# Patient Record
Sex: MALE | Race: BLACK OR AFRICAN AMERICAN | NOT HISPANIC OR LATINO | URBAN - METROPOLITAN AREA
[De-identification: names, ages, dates, MRNs, and addresses within clinical notes are randomized per-mention and may not be internally consistent; named-entity substitution may affect disease eponyms.]

---

## 2023-01-27 PROBLEM — I10 ESSENTIAL HYPERTENSION: Status: ACTIVE | Noted: 2023-01-27

## 2023-01-27 PROBLEM — M25.519 SHOULDER PAIN: Status: ACTIVE | Noted: 2023-01-27

## 2023-01-27 PROBLEM — E78.5 TYPE 2 DIABETES MELLITUS WITH HYPERLIPIDEMIA (MULTI): Status: ACTIVE | Noted: 2023-01-27

## 2023-01-27 PROBLEM — R52 PHANTOM PAIN: Status: ACTIVE | Noted: 2023-01-27

## 2023-01-27 PROBLEM — M62.838 MUSCLE SPASM: Status: ACTIVE | Noted: 2023-01-27

## 2023-01-27 PROBLEM — R06.02 SHORTNESS OF BREATH: Status: ACTIVE | Noted: 2023-01-27

## 2023-01-27 PROBLEM — I50.9 CHF (CONGESTIVE HEART FAILURE) (MULTI): Status: ACTIVE | Noted: 2023-01-27

## 2023-01-27 PROBLEM — M48.02 CERVICAL STENOSIS OF SPINE: Status: ACTIVE | Noted: 2023-01-27

## 2023-01-27 PROBLEM — R32 URINARY INCONTINENCE: Status: ACTIVE | Noted: 2023-01-27

## 2023-01-27 PROBLEM — R78.81 BACTEREMIA: Status: ACTIVE | Noted: 2023-01-27

## 2023-01-27 PROBLEM — N17.9 AKI (ACUTE KIDNEY INJURY) (CMS-HCC): Status: ACTIVE | Noted: 2023-01-27

## 2023-01-27 PROBLEM — G89.4 CHRONIC PAIN SYNDROME: Status: ACTIVE | Noted: 2023-01-27

## 2023-01-27 PROBLEM — N39.0 UTI (URINARY TRACT INFECTION): Status: ACTIVE | Noted: 2023-01-27

## 2023-01-27 PROBLEM — S98.132A: Status: ACTIVE | Noted: 2023-01-27

## 2023-01-27 PROBLEM — M19.90 ARTHRITIS: Status: ACTIVE | Noted: 2023-01-27

## 2023-01-27 PROBLEM — M54.50 LOW BACK PAIN: Status: ACTIVE | Noted: 2023-01-27

## 2023-01-27 PROBLEM — L30.9 DERMATITIS: Status: ACTIVE | Noted: 2023-01-27

## 2023-01-27 PROBLEM — R20.0 NUMBNESS AND TINGLING OF HAND: Status: ACTIVE | Noted: 2023-01-27

## 2023-01-27 PROBLEM — R07.9 CHEST PAIN: Status: ACTIVE | Noted: 2023-01-27

## 2023-01-27 PROBLEM — S88.111A: Status: ACTIVE | Noted: 2023-01-27

## 2023-01-27 PROBLEM — G54.8 PHANTOM PAIN: Status: ACTIVE | Noted: 2023-01-27

## 2023-01-27 PROBLEM — M25.562 BILATERAL KNEE PAIN: Status: ACTIVE | Noted: 2023-01-27

## 2023-01-27 PROBLEM — R60.9 PERIPHERAL EDEMA: Status: ACTIVE | Noted: 2023-01-27

## 2023-01-27 PROBLEM — K21.9 CHRONIC GERD: Status: ACTIVE | Noted: 2023-01-27

## 2023-01-27 PROBLEM — M47.12 OTHER SPONDYLOSIS WITH MYELOPATHY, CERVICAL REGION: Status: ACTIVE | Noted: 2023-01-27

## 2023-01-27 PROBLEM — E78.5 HLD (HYPERLIPIDEMIA): Status: ACTIVE | Noted: 2023-01-27

## 2023-01-27 PROBLEM — M17.9 KNEE OSTEOARTHRITIS: Status: ACTIVE | Noted: 2023-01-27

## 2023-01-27 PROBLEM — M54.16 LUMBAR RADICULOPATHY: Status: ACTIVE | Noted: 2023-01-27

## 2023-01-27 PROBLEM — R60.0 LEG EDEMA, LEFT: Status: ACTIVE | Noted: 2023-01-27

## 2023-01-27 PROBLEM — G40.909: Status: ACTIVE | Noted: 2023-01-27

## 2023-01-27 PROBLEM — R60.0 PERIPHERAL EDEMA: Status: ACTIVE | Noted: 2023-01-27

## 2023-01-27 PROBLEM — R20.2 NUMBNESS AND TINGLING OF HAND: Status: ACTIVE | Noted: 2023-01-27

## 2023-01-27 PROBLEM — L97.421 SKIN ULCER OF LEFT HEEL, LIMITED TO BREAKDOWN OF SKIN (MULTI): Status: ACTIVE | Noted: 2023-01-27

## 2023-01-27 PROBLEM — G56.23 ULNAR NEUROPATHY OF BOTH UPPER EXTREMITIES: Status: ACTIVE | Noted: 2023-01-27

## 2023-01-27 PROBLEM — E87.6 HYPOKALEMIA: Status: ACTIVE | Noted: 2023-01-27

## 2023-01-27 PROBLEM — E11.69 TYPE 2 DIABETES MELLITUS WITH HYPERLIPIDEMIA (MULTI): Status: ACTIVE | Noted: 2023-01-27

## 2023-01-27 PROBLEM — I50.20 HFREF (HEART FAILURE WITH REDUCED EJECTION FRACTION) (MULTI): Status: ACTIVE | Noted: 2023-01-27

## 2023-01-27 PROBLEM — I25.10 CAD (CORONARY ARTERY DISEASE): Status: ACTIVE | Noted: 2023-01-27

## 2023-01-27 PROBLEM — E11.49: Status: ACTIVE | Noted: 2023-01-27

## 2023-01-27 PROBLEM — M25.561 BILATERAL KNEE PAIN: Status: ACTIVE | Noted: 2023-01-27

## 2023-01-27 PROBLEM — N18.30 CKD (CHRONIC KIDNEY DISEASE), STAGE III (MULTI): Status: ACTIVE | Noted: 2023-01-27

## 2023-01-27 PROBLEM — N52.9 ERECTILE DYSFUNCTION: Status: ACTIVE | Noted: 2023-01-27

## 2023-01-27 PROBLEM — L97.529 FOOT ULCER, LEFT (MULTI): Status: ACTIVE | Noted: 2023-01-27

## 2023-01-27 RX ORDER — ESOMEPRAZOLE MAGNESIUM 40 MG/1
40 CAPSULE, DELAYED RELEASE ORAL DAILY
COMMUNITY
Start: 2019-09-01 | End: 2023-03-15

## 2023-01-27 RX ORDER — MULTIVITAMIN
1 TABLET ORAL DAILY
COMMUNITY
Start: 2021-02-26

## 2023-01-27 RX ORDER — FUROSEMIDE 20 MG/1
40 TABLET ORAL DAILY
COMMUNITY
Start: 2017-08-08 | End: 2023-03-29 | Stop reason: ALTCHOICE

## 2023-01-27 RX ORDER — ASPIRIN 81 MG/1
81 TABLET ORAL DAILY
COMMUNITY
Start: 2021-02-26

## 2023-01-27 RX ORDER — ISOSORBIDE MONONITRATE 30 MG/1
1 TABLET, EXTENDED RELEASE ORAL DAILY
COMMUNITY
Start: 2021-02-26

## 2023-01-27 RX ORDER — NITROFURANTOIN 25; 75 MG/1; MG/1
100 CAPSULE ORAL 2 TIMES DAILY
COMMUNITY
End: 2023-03-15

## 2023-01-27 RX ORDER — PEN NEEDLE, DIABETIC 30 GX3/16"
1 NEEDLE, DISPOSABLE MISCELLANEOUS
COMMUNITY
End: 2023-06-05 | Stop reason: SDUPTHER

## 2023-01-27 RX ORDER — METOPROLOL SUCCINATE 50 MG/1
TABLET, EXTENDED RELEASE ORAL
COMMUNITY
Start: 2021-02-26 | End: 2023-03-15

## 2023-01-27 RX ORDER — CLINDAMYCIN HYDROCHLORIDE 300 MG/1
300 CAPSULE ORAL
COMMUNITY
Start: 2021-08-31 | End: 2023-03-15 | Stop reason: ALTCHOICE

## 2023-01-27 RX ORDER — OXYCODONE AND ACETAMINOPHEN 5; 325 MG/1; MG/1
1 TABLET ORAL EVERY 12 HOURS PRN
COMMUNITY
Start: 2020-06-05

## 2023-01-27 RX ORDER — FLUOCINONIDE 0.5 MG/G
1 CREAM TOPICAL 2 TIMES DAILY PRN
COMMUNITY
Start: 2020-06-10

## 2023-01-27 RX ORDER — POTASSIUM CHLORIDE 750 MG/1
10 TABLET, FILM COATED, EXTENDED RELEASE ORAL DAILY
COMMUNITY
Start: 2021-02-26

## 2023-01-27 RX ORDER — FERROUS SULFATE 325(65) MG
65 TABLET ORAL DAILY
COMMUNITY
Start: 2021-02-26

## 2023-01-27 RX ORDER — GLIPIZIDE 10 MG/1
10 TABLET ORAL
COMMUNITY
Start: 2021-02-26 | End: 2023-03-15

## 2023-01-27 RX ORDER — BACLOFEN 10 MG/1
5-10 TABLET ORAL NIGHTLY PRN
COMMUNITY
Start: 2017-05-30 | End: 2023-03-15 | Stop reason: ALTCHOICE

## 2023-01-27 RX ORDER — ATORVASTATIN CALCIUM 80 MG/1
80 TABLET, FILM COATED ORAL NIGHTLY
COMMUNITY
Start: 2018-03-23

## 2023-01-27 RX ORDER — CALCIUM CARBONATE 600 MG
600 TABLET ORAL DAILY
COMMUNITY
Start: 2021-02-26 | End: 2023-03-15

## 2023-01-27 RX ORDER — INSULIN GLARGINE 100 [IU]/ML
8 INJECTION, SOLUTION SUBCUTANEOUS EVERY MORNING
COMMUNITY
End: 2023-03-15 | Stop reason: SDUPTHER

## 2023-03-03 PROBLEM — E66.01 CLASS 2 SEVERE OBESITY WITH SERIOUS COMORBIDITY AND BODY MASS INDEX (BMI) OF 36.0 TO 36.9 IN ADULT (MULTI): Status: ACTIVE | Noted: 2023-03-03

## 2023-03-03 PROBLEM — E66.812 CLASS 2 SEVERE OBESITY WITH SERIOUS COMORBIDITY AND BODY MASS INDEX (BMI) OF 36.0 TO 36.9 IN ADULT: Status: ACTIVE | Noted: 2023-03-03

## 2023-03-03 RX ORDER — ALLOPURINOL 100 MG/1
TABLET ORAL
COMMUNITY
End: 2023-10-12 | Stop reason: SDUPTHER

## 2023-03-03 RX ORDER — NITROGLYCERIN 0.3 MG/1
0.3 TABLET SUBLINGUAL EVERY 5 MIN PRN
COMMUNITY

## 2023-03-03 RX ORDER — DULAGLUTIDE 1.5 MG/.5ML
1.5 INJECTION, SOLUTION SUBCUTANEOUS
COMMUNITY
End: 2023-05-08 | Stop reason: DRUGHIGH

## 2023-03-03 RX ORDER — CHOLECALCIFEROL (VITAMIN D3) 125 MCG
125 CAPSULE ORAL DAILY
COMMUNITY

## 2023-03-03 RX ORDER — HYDRALAZINE HYDROCHLORIDE 50 MG/1
50 TABLET, FILM COATED ORAL 3 TIMES DAILY
COMMUNITY

## 2023-03-03 RX ORDER — TORSEMIDE 20 MG/1
40 TABLET ORAL DAILY
COMMUNITY

## 2023-03-03 RX ORDER — PANTOPRAZOLE SODIUM 40 MG/1
40 TABLET, DELAYED RELEASE ORAL
COMMUNITY

## 2023-03-15 ENCOUNTER — TELEMEDICINE (OUTPATIENT)
Dept: PHARMACY | Facility: HOSPITAL | Age: 73
End: 2023-03-15
Payer: MEDICARE

## 2023-03-15 DIAGNOSIS — Z79.4 TYPE 2 DIABETES MELLITUS WITH HYPERGLYCEMIA, WITH LONG-TERM CURRENT USE OF INSULIN (MULTI): Primary | ICD-10-CM

## 2023-03-15 DIAGNOSIS — Z79.4 TYPE 2 DIABETES MELLITUS WITH HYPERGLYCEMIA, WITH LONG-TERM CURRENT USE OF INSULIN (MULTI): ICD-10-CM

## 2023-03-15 DIAGNOSIS — E11.65 TYPE 2 DIABETES MELLITUS WITH HYPERGLYCEMIA, WITH LONG-TERM CURRENT USE OF INSULIN (MULTI): Primary | ICD-10-CM

## 2023-03-15 DIAGNOSIS — E11.65 TYPE 2 DIABETES MELLITUS WITH HYPERGLYCEMIA, WITH LONG-TERM CURRENT USE OF INSULIN (MULTI): ICD-10-CM

## 2023-03-15 RX ORDER — CALCIUM CARBONATE 500(1250)
1 TABLET ORAL DAILY
COMMUNITY
Start: 2022-05-31

## 2023-03-15 RX ORDER — INSULIN GLARGINE 100 [IU]/ML
8 INJECTION, SOLUTION SUBCUTANEOUS NIGHTLY
Qty: 15 ML | Refills: 0 | Status: SHIPPED | OUTPATIENT
Start: 2023-03-15 | End: 2023-03-29 | Stop reason: HOSPADM

## 2023-03-15 RX ORDER — METOPROLOL SUCCINATE 25 MG/1
25 TABLET, EXTENDED RELEASE ORAL DAILY
COMMUNITY
Start: 2022-09-12

## 2023-03-15 NOTE — ASSESSMENT & PLAN NOTE
Patient reports well controlled blood glucose numbers at home. Denies hypoglycemic episodes.     Continue Lantus and Trulicity as prescribed  Obtain updated lab work

## 2023-03-15 NOTE — PROGRESS NOTES
Subjective   Patient ID: Vasquez Gastelum is a 73 y.o. male who presents for Diabetes.  Diabetes  He has type 2 diabetes mellitus. Current diabetic treatment includes insulin injections. He is compliant with treatment most of the time. He is following a generally unhealthy diet. His overall blood glucose range is 130-140 mg/dl. An ACE inhibitor/angiotensin II receptor blocker is not being taken.       Referring Provider: Tricia Arredondo MD     Objective     There were no vitals taken for this visit.     LAB  Lab Results   Component Value Date    BILITOT 0.8 12/09/2022    CALCIUM 9.4 12/09/2022    CO2 27 12/09/2022     12/09/2022    CREATININE 2.14 (H) 12/09/2022    GLUCOSE 423 (H) 12/09/2022    ALKPHOS 172 (H) 12/09/2022    K 4.7 12/09/2022    PROT 7.3 12/09/2022     (L) 12/09/2022    AST 19 12/09/2022    ALT 20 12/09/2022    BUN 30 (H) 12/09/2022    ANIONGAP 12 12/09/2022    ALBUMIN 3.6 12/09/2022    GFRMALE 32 (A) 12/09/2022     Lab Results   Component Value Date    TRIG 101 09/02/2021    CHOL 105 12/09/2022    HDL 38.8 (A) 12/09/2022     Lab Results   Component Value Date    HGBA1C 13.2 (A) 12/09/2022       Assessment/Plan   Problem List Items Addressed This Visit    None  Visit Diagnoses       Type 2 diabetes mellitus with hyperglycemia, with long-term current use of insulin (CMS/[facility])        Relevant Medications    insulin glargine (Lantus) 100 unit/mL (3 mL) pen          Patient to obtain updated labs next week. Follow-up the week after. Refill of Lantus sent to [facility] to mail to patient. Patient using 8 units daily, reports takes either in morning or evening depending on when he remembers, encouraged patient to stick to a consistent dosing schedule.     Jodie GarsiaD [facility]  Clinical Pharmacist     Verbal consent to manage patient's drug therapy was obtained from [the patient and/or an individual authorized to act on behalf of a patient]. They were informed they may decline to participate  or withdraw from participation in pharmacy services at any time.

## 2023-03-27 ENCOUNTER — PATIENT OUTREACH (OUTPATIENT)
Dept: CARE COORDINATION | Facility: CLINIC | Age: 73
End: 2023-03-27
Payer: MEDICARE

## 2023-03-27 RX ORDER — INSULIN LISPRO 100 [IU]/ML
1-5 INJECTION, SUSPENSION SUBCUTANEOUS
COMMUNITY
End: 2023-03-29 | Stop reason: ALTCHOICE

## 2023-03-27 NOTE — PROGRESS NOTES
This patient was discharged from: [facility]  Discharge diagnosis: Dysphagia, nausea and vomiting  Admit date: 3/20/23  Date of discharge: 3/25/23      No PCP appointments available within 14 days of discharge  Message sent to practice clinical pool to reach out to patient and schedule an appointment within 7-13 days from discharge date.    Engagement  Call Start Time: 1410 (3/27/2023  2:11 PM)    Medications  Medications reviewed with patient/caregiver?: Yes (3/27/2023  2:11 PM)  Is the patient having any side effects they believe may be caused by any medication additions or changes?: No (3/27/2023  2:11 PM)  Does the patient have all medications ordered at discharge?: Yes (3/27/2023  2:11 PM)  Care Management Interventions: Nurse provided patient education (3/27/2023  2:11 PM)  Is the patient taking all medications as directed (includes completed medication regime)?: Yes (3/27/2023  2:11 PM)  Care Management Interventions: Nurse provided patient education; Nurse notified pharmacy for assistance (3/27/2023  2:11 PM)  Medication Comments: new/changed medications reconciled only, patient declines any issues or concerns with his regular medications (3/27/2023  2:11 PM)    Appointments  Does the patient have a primary care provider?: Yes (3/27/2023  2:11 PM)  Care Management Interventions: Educated patient on importance of making appointment (3/27/2023  2:11 PM)  Care Management Interventions: Advised patient to keep appointment; Advised to schedule with specialist (esophageal manometry, endocrine, podiatry. patient verbalizes understanding.) (3/27/2023  2:11 PM)    Self Management  What is the home health agency?: [facility] (3/27/2023  2:11 PM)  Has home health visited the patient within 72 hours of discharge?: Yes (visit scheduled tomorrow 3/28) (3/27/2023  2:11 PM)  What Durable Medical Equipment (DME) was ordered?: wound care supplies (3/27/2023  2:11 PM)  Has all Durable Medical  Equipment (DME) been delivered?: Yes (3/27/2023  2:11 PM)    Patient Teaching  Does the patient have access to their discharge instructions?: Yes (3/27/2023  2:11 PM)  Care Management Interventions: Reviewed instructions with patient (3/27/2023  2:11 PM)  What is the patient's perception of their health status since discharge?: Improving (reports tolerating liquid diet well) (3/27/2023  2:11 PM)  Is the patient/caregiver able to teach back the hierarchy of who to call/visit for symptoms/problems? PCP, Specialist, Home Health nurse, Urgent Care, ED, 911: Yes (3/27/2023  2:11 PM)

## 2023-03-29 ENCOUNTER — PATIENT OUTREACH (OUTPATIENT)
Dept: PHARMACY | Facility: HOSPITAL | Age: 73
End: 2023-03-29
Payer: MEDICARE

## 2023-03-29 RX ORDER — BLOOD SUGAR DIAGNOSTIC
1 STRIP MISCELLANEOUS
COMMUNITY
Start: 2022-12-23

## 2023-03-29 RX ORDER — INSULIN LISPRO 100 [IU]/ML
1-5 INJECTION, SOLUTION INTRAVENOUS; SUBCUTANEOUS
COMMUNITY
Start: 2023-03-28

## 2023-03-29 RX ORDER — BACLOFEN 10 MG/1
5-10 TABLET ORAL NIGHTLY PRN
COMMUNITY

## 2023-03-29 RX ORDER — LANCETS
1 EACH MISCELLANEOUS
COMMUNITY
Start: 2022-12-23

## 2023-03-29 NOTE — PROGRESS NOTES
"Transitional Care Management: Pharmacy    Subjective   Vasquez Gastelum is a 73 y.o. male who was referred to Clinical Pharmacy Team to complete TCM medication reconciliation. A comprehensive medication review was completed with the patient. patient had all medications and/or an updated medication list in front of them during the telephone encounter.     General Medication Management    Type of medication management: comprehensive medication review, transitions of care  Referred by: nurse  Recipient: beneficiary  Provider: physician  Visit type: initial  Method of contact: by telephone  Medications at visit: does not bring in medications         Admission Date: 03/20/2023  Discharge Date: 03/25/2023  Discharge Diagnosis: Dysphagia, nausea and vomiting   Discharged From: [facility]     Notable medication changes following discharge:  START:  Humalog kwikpen 100 unit/mL inject 1-5 units 3 times a day with meals per sliding scale   STOP:  Lantus 100 unit/mL inject 8 units once daily at bedtime    MEDICATION RECONCILIATION  Added:  Accu-chek test strips use 1 strip to test blood sugar  Accu-chek softclix lancets use 1 lancet to test blood sugar  Humalog kwikpen 100 unit/mL 1-5 units 3 times a day with meals per sliding scale   Baclofen 10 mg 0.5-1 tablet nightly as needed   Changed:  Fluocinonide 0.05% cream apply to affected areas twice daily as needed (added as needed in instructions)  Pen needles 32G 5/32\" use 1 pen needle 3 times daily with insulin injections (changed from once daily)  Removed:  Furosemide 20 mg once daily (patient is taking torsemide)  Lantus 100 unit/mL 8 units daily at bedtime (discontinued at discharge)  Humalog Mix 50-50 1-5 units 3 times a day per insulin instructions (patient is using regular insulin lispro only not Humalog Mix)    Current Outpatient Medications on File Prior to Visit   Medication Sig Dispense Refill    Accu-Chek Guide test strips strip 1 strip. To test blood " "sugar      Accu-Chek Softclix Lancets misc 1 Lancet. To test blood sugar      allopurinol (Zyloprim) 100 mg tablet       aspirin 81 mg EC tablet Take 1 tablet (81 mg) by mouth once daily.      atorvastatin (Lipitor) 80 mg tablet Take 1 tablet (80 mg) by mouth once daily at bedtime.      baclofen (Lioresal) 10 mg tablet Take 0.5-1 tablets (5-10 mg) by mouth as needed at bedtime for muscle spasms.      calcium 500 mg calcium (1,250 mg) tablet Take 1 tablet (500 mg) by mouth once daily.      cholecalciferol (Vitamin D-3) 125 MCG (5000 UT) capsule Take 1 capsule (125 mcg) by mouth once daily.      dulaglutide (Trulicity) 1.5 mg/0.5 mL pen injector Inject 1.5 mg under the skin 1 (one) time per week.      ferrous sulfate 325 (65 Fe) MG tablet Take 1 tablet (65 mg of iron) by mouth once daily.      fluocinonide 0.05 % cream Apply 1 Application topically 2 times a day as needed.      HumaLOG KwikPen Insulin 100 unit/mL injection Inject 1-5 Units under the skin in the morning and 1-5 Units at noon and 1-5 Units in the evening. Inject with meals. Per sliding scale.      hydrALAZINE (Apresoline) 50 mg tablet Take 1 tablet (50 mg) by mouth in the morning and 1 tablet (50 mg) in the evening and 1 tablet (50 mg) before bedtime.      isosorbide mononitrate ER (Imdur) 30 mg 24 hr tablet Take 1 tablet (30 mg) by mouth once daily.      metoprolol succinate XL (Toprol-XL) 25 mg 24 hr tablet Take 1 tablet (25 mg) by mouth once daily.      multivitamin tablet Take 1 tablet by mouth once daily.      nitroglycerin (Nitrostat) 0.3 mg SL tablet Place 1 tablet (0.3 mg) under the tongue every 5 minutes if needed for chest pain.      oxyCODONE-acetaminophen (Percocet) 5-325 mg tablet Take 1 tablet by mouth every 12 hours if needed.      pantoprazole (ProtoNix) 40 mg EC tablet Take 1 tablet (40 mg) by mouth once daily in the morning. Take before meals. Do not crush, chew, or split.      pen needle, diabetic 32 gauge x 5/32\" needle 1 Pen needle  " in the morning and 1 Pen needle at noon and 1 Pen needle in the evening. Take before meals. With insulin injections.      potassium chloride CR (Klor-Con) 10 mEq ER tablet Take 1 tablet (10 mEq) by mouth once daily.      torsemide (Demadex) 20 mg tablet Take 2 tablets (40 mg) by mouth once daily.      [DISCONTINUED] insulin lispro protamin-lispro (HumaLOG Mix 50-50 KwikPen) 100 unit/mL (50-50) injection Inject 1-5 Units under the skin in the morning and 1-5 Units in the evening. Inject with meals. Take as directed per insulin instructions.  Inject 1-5 Units subcutaneously as directed..      [DISCONTINUED] furosemide (Lasix) 20 mg tablet Take 2 tablets (40 mg) by mouth 1 (one) time each day.      [DISCONTINUED] insulin glargine (Lantus) 100 unit/mL (3 mL) pen Inject 8 Units under the skin once daily at bedtime. (Patient not taking: Reported on 3/27/2023) 15 mL 0     No current facility-administered medications on file prior to visit.      Allergies   Allergen Reactions    Nsaids (Non-Steroidal Anti-Inflammatory Drug) Unknown    Other Rash     tomatoes       [facility]  [address] [place] [id number]  Phone: [phone] Fax: [phone]    [facility] [place], [place] - [address]  [address]  [address]  [place] [id number]  Phone: [phone] Fax: [phone]       No issues reported in regards to accessibility affordability adherence adverse effects organization.  Medication Adherence    Patient reported X missed doses in the last 7 days: 0  Demonstrates understanding of importance of adherence: yes  Informant: patient  Reliability of informant: reliable  Estimated medication adherence level: good  Reasons for non-adherence: no problems identified         Objective     LAB  Wt Readings from Last 3 Encounters:   12/09/22 113 kg (249 lb)   09/02/21 112 kg (248 lb)   09/25/20 109 kg (241 lb)     Pulse Readings from Last 3 Encounters:   12/09/22 77   09/02/21 88    09/25/20 85     BP Readings from Last 3 Encounters:   12/09/22 118/60   09/02/21 118/50   09/25/20 120/70      Lab Results   Component Value Date    BILITOT 0.8 12/09/2022    CALCIUM 9.4 12/09/2022    CO2 27 12/09/2022     12/09/2022    CREATININE 2.14 (H) 12/09/2022    CREATININE 2.13 (H) 10/25/2022    CREATININE 1.85 (H) 09/02/2021    GLUCOSE 423 (H) 12/09/2022    ALKPHOS 172 (H) 12/09/2022    K 4.7 12/09/2022    PROT 7.3 12/09/2022     (L) 12/09/2022    AST 19 12/09/2022    ALT 20 12/09/2022    BUN 30 (H) 12/09/2022    ANIONGAP 12 12/09/2022    ALBUMIN 3.6 12/09/2022    GFRMALE 32 (A) 12/09/2022    GFRMALE 32 (A) 10/25/2022     Lab Results   Component Value Date    TRIG 101 09/02/2021    CHOL 105 12/09/2022    CHOL 101 09/02/2021    CHOL 114 06/18/2019    HDL 38.8 (A) 12/09/2022    HDL 49.6 09/02/2021    HDL 58.3 06/18/2019     Lab Results   Component Value Date    HGBA1C 13.2 (A) 12/09/2022    HGBA1C 7.2 (A) 09/02/2021    HGBA1C 8.7 09/25/2020       Outpatient Morphine Milligram Equivalents Per Day       3/29/23 and after 15 MME/Day      Order Name Dose Route Frequency Maximum MME/Day     oxyCODONE-acetaminophen (Percocet) 5-325 mg tablet 1 tablet oral Every 12 hours PRN 15 MME/Day    Total Potential Morphine Milligram Equivalents Per Day 15 MME/Day      Calculation Information          oxyCODONE-acetaminophen (Percocet) 5-325 mg tablet    oxyCODONE-acetaminophen 5-325 mg tablet: single dose of 5 mg of opioid * 2 doses per day * morphine equivalence factor of 1.5 = 15 MME/Day                                    DRUG INTERACTIONS  No significant drug-drug interactions exist that require adjustment to therapy    Assessment/Plan    Indication, effectiveness, safety and convenience of his medications were reviewed today. The patient's medical conditions were assessed, evaluated, and deemed meeting goals of drug therapy, with the following exceptions.      Medication Therapy Recommendations Needing  Review  No medication therapy recommendations to display     Completed Medication Therapy Recommendations  No medication therapy recommendations to display    Comments/Recommendations to PCP:  Patient's insulin glargine was discontinued at discharge and he was continued on sliding scale insulin lispro   Patient recently had a 90 day supply of Glipizide filled 03/20/23 which was also not continued at discharge. Patient did not state that he was taking this prior to his hospitalization, however  All medications are dosed appropriately based on the most up to date renal and hepatic lab results    Ajith Vu PharmD  [facility] PGY-1 Pharmacy Resident     Verbal consent to manage patient's drug therapy was obtained from the patient. They were informed they may decline to participate or withdraw from participation in pharmacy services at any time.

## 2023-03-29 NOTE — PROGRESS NOTES
I reviewed the progress note and agree with the resident’s findings and plans as written. Case discussed with resident.    Jodie Pacheco, AdyD

## 2023-04-05 ENCOUNTER — TELEPHONE (OUTPATIENT)
Dept: PRIMARY CARE | Facility: CLINIC | Age: 73
End: 2023-04-05

## 2023-04-07 ENCOUNTER — PATIENT OUTREACH (OUTPATIENT)
Dept: CARE COORDINATION | Facility: CLINIC | Age: 73
End: 2023-04-07
Payer: MEDICARE

## 2023-04-07 NOTE — PROGRESS NOTES
Unable to reach patient for follow up call back after patient's hospitalization. No follow up appt made at this time. Attempt made and left voicemail with call back number for patient if any questions or concerns do arise.

## 2023-04-10 ENCOUNTER — TELEMEDICINE (OUTPATIENT)
Dept: PHARMACY | Facility: HOSPITAL | Age: 73
End: 2023-04-10
Payer: MEDICARE

## 2023-04-10 DIAGNOSIS — Z79.4 TYPE 2 DIABETES MELLITUS WITH HYPERGLYCEMIA, WITH LONG-TERM CURRENT USE OF INSULIN (MULTI): ICD-10-CM

## 2023-04-10 DIAGNOSIS — E11.69 TYPE 2 DIABETES MELLITUS WITH HYPERLIPIDEMIA (MULTI): Primary | ICD-10-CM

## 2023-04-10 DIAGNOSIS — E11.65 TYPE 2 DIABETES MELLITUS WITH HYPERGLYCEMIA, WITH LONG-TERM CURRENT USE OF INSULIN (MULTI): ICD-10-CM

## 2023-04-10 DIAGNOSIS — E78.5 TYPE 2 DIABETES MELLITUS WITH HYPERLIPIDEMIA (MULTI): Primary | ICD-10-CM

## 2023-04-10 NOTE — ASSESSMENT & PLAN NOTE
Patient reports well controlled home BG. Denies episodes of hypoglycemia.     CONTINUE Trulicity 1.5 mg/0.5 mL once weekly   CONTINUE Humalog sliding scale

## 2023-04-10 NOTE — PROGRESS NOTES
Subjective     Patient ID: Vasquez Gastelum is a 73 y.o. male who presents for Diabetes.    Diabetes  He presents for his follow-up diabetic visit. He has type 2 diabetes mellitus. There are no hypoglycemic associated symptoms. Current diabetic treatment includes insulin injections (GLP-1 RA (Trulicity)). He is compliant with treatment all of the time. He never participates in exercise. (Reports BG below 150 mg/dL on average)     Patient unaware of endocrinology appointment with [facility] provider tomorrow 4/11/23. States he has not received any communication from them. States he is going to see [facility] Internal Medicine for his throat.     Allergies   Allergen Reactions    Nsaids (Non-Steroidal Anti-Inflammatory Drug) Unknown    Other Rash     tomatoes       Objective       SECONDARY PREVENTION  - Statin? Yes  - ACE-I/ARB? No  - Aspirin? Yes    Current monitoring regimen:   Patient is using: glucometer    SMBG Readings: Reports few readings above 150 mg/dL which he corrects using SS Humalog     Any episodes of hypoglycemia? no  Hypoglycemia awareness? Yes    There were no vitals taken for this visit.    Pertinent PMH Review:  - PMH of Pancreatitis: No  - PMH/FH of Medullary Thyroid Cancer: No  - PMH of Retinopathy: No  - PMH of Urinary Tract Infections: No    Lab Review  Lab Results   Component Value Date    BILITOT 0.8 12/09/2022    CALCIUM 9.4 12/09/2022    CO2 27 12/09/2022     12/09/2022    CREATININE 2.14 (H) 12/09/2022    GLUCOSE 423 (H) 12/09/2022    ALKPHOS 172 (H) 12/09/2022    K 4.7 12/09/2022    PROT 7.3 12/09/2022     (L) 12/09/2022    AST 19 12/09/2022    ALT 20 12/09/2022    BUN 30 (H) 12/09/2022    ANIONGAP 12 12/09/2022    ALBUMIN 3.6 12/09/2022    GFRMALE 32 (A) 12/09/2022     Lab Results   Component Value Date    TRIG 101 09/02/2021    CHOL 105 12/09/2022    HDL 38.8 (A) 12/09/2022     Lab Results   Component Value Date    HGBA1C 13.2 (A) 12/09/2022     The ASCVD Risk  score (Camille DIAMOND, et al., 2019) failed to calculate for the following reasons:    The valid total cholesterol range is 130 to 320 mg/dL    Health Maintenance:   Foot Exam: Unknown  Eye Exam: Unknown   Lipid Panel: 12/9/22  Urine Album/in: 12/9/22      Assessment/Plan     Problem List Items Addressed This Visit          Endocrine/Metabolic     Patient reports well controlled home BG. Denies episodes of hypoglycemia.     CONTINUE Trulicity 1.5 mg/0.5 mL once weekly   CONTINUE Humalog sliding scale            Type 2 diabetes mellitus with hyperlipidemia (CMS/[facility]) - Primary     Other Visit Diagnoses       Type 2 diabetes mellitus with hyperglycemia, with long-term current use of insulin (CMS/[facility])                Type 2 diabetes mellitus, is not at goal. Last A1C significantly elevated, home BG reported within range     Follow up: I recommend diabetes care be 1 month.    Jodie Pacheco PharmD [facility]  Clinical Pharmacist

## 2023-05-01 ENCOUNTER — OFFICE VISIT (OUTPATIENT)
Dept: PRIMARY CARE | Facility: CLINIC | Age: 73
End: 2023-05-01
Payer: MEDICARE

## 2023-05-01 VITALS
HEART RATE: 88 BPM | WEIGHT: 254.8 LBS | BODY MASS INDEX: 37.74 KG/M2 | DIASTOLIC BLOOD PRESSURE: 52 MMHG | OXYGEN SATURATION: 100 % | RESPIRATION RATE: 13 BRPM | SYSTOLIC BLOOD PRESSURE: 116 MMHG | TEMPERATURE: 97.9 F | HEIGHT: 69 IN

## 2023-05-01 DIAGNOSIS — E11.49 DM TYPE 2 CAUSING NEUROLOGICAL DISEASE (MULTI): Primary | ICD-10-CM

## 2023-05-01 PROBLEM — E11.3591: Status: ACTIVE | Noted: 2018-05-01

## 2023-05-01 PROBLEM — E16.2 HYPOGLYCEMIA: Status: ACTIVE | Noted: 2023-02-03

## 2023-05-01 PROBLEM — E04.1 THYROID NODULE: Status: ACTIVE | Noted: 2022-04-06

## 2023-05-01 PROBLEM — L97.422 NON-PRESSURE CHRONIC ULCER OF LEFT HEEL AND MIDFOOT WITH FAT LAYER EXPOSED (MULTI): Status: ACTIVE | Noted: 2023-03-22

## 2023-05-01 PROBLEM — Z98.61 CORONARY ANGIOPLASTY STATUS: Status: ACTIVE | Noted: 2020-09-03

## 2023-05-01 PROBLEM — R53.1 WEAKNESS: Status: ACTIVE | Noted: 2020-07-12

## 2023-05-01 PROBLEM — R13.10 DYSPHAGIA: Status: ACTIVE | Noted: 2023-03-21

## 2023-05-01 PROBLEM — I50.32 CHRONIC DIASTOLIC CHF (CONGESTIVE HEART FAILURE) (MULTI): Status: ACTIVE | Noted: 2020-07-15

## 2023-05-01 PROBLEM — S62.327A CLOSED DISPLACED FRACTURE OF SHAFT OF FIFTH METACARPAL BONE OF LEFT HAND: Status: ACTIVE | Noted: 2020-03-06

## 2023-05-01 PROBLEM — L97.423: Status: ACTIVE | Noted: 2020-07-12

## 2023-05-01 PROBLEM — Z98.890 S/P EYE SURGERY: Status: ACTIVE | Noted: 2018-07-16

## 2023-05-01 PROBLEM — N18.32 STAGE 3B CHRONIC KIDNEY DISEASE (MULTI): Status: ACTIVE | Noted: 2022-08-10

## 2023-05-01 PROBLEM — H25.811 COMBINED FORM OF SENILE CATARACT OF RIGHT EYE: Status: ACTIVE | Noted: 2018-05-01

## 2023-05-01 PROBLEM — E66.9 OBESITY, CLASS I, BMI 30-34.9: Status: ACTIVE | Noted: 2022-12-20

## 2023-05-01 PROBLEM — L08.9 INFECTED ABRASION OF LEFT FOOT: Status: ACTIVE | Noted: 2018-12-17

## 2023-05-01 PROBLEM — R11.10 INTRACTABLE VOMITING: Status: ACTIVE | Noted: 2023-03-21

## 2023-05-01 PROBLEM — M86.9: Status: ACTIVE | Noted: 2020-02-19

## 2023-05-01 PROBLEM — R82.81 PYURIA: Status: ACTIVE | Noted: 2023-03-21

## 2023-05-01 PROBLEM — S52.132A CLOSED DISPLACED FRACTURE OF NECK OF LEFT RADIUS: Status: ACTIVE | Noted: 2020-03-06

## 2023-05-01 PROBLEM — S90.822A: Status: ACTIVE | Noted: 2018-12-17

## 2023-05-01 PROBLEM — I72.9 PSEUDOANEURYSM (CMS-HCC): Status: ACTIVE | Noted: 2018-04-03

## 2023-05-01 PROBLEM — H35.351 CME (CYSTOID MACULAR EDEMA), RIGHT: Status: ACTIVE | Noted: 2018-07-16

## 2023-05-01 PROBLEM — L08.9: Status: ACTIVE | Noted: 2018-12-17

## 2023-05-01 PROBLEM — E66.811 OBESITY, CLASS I, BMI 30-34.9: Status: ACTIVE | Noted: 2022-12-20

## 2023-05-01 PROBLEM — Z89.511 HISTORY OF BELOW-KNEE AMPUTATION OF RIGHT LOWER EXTREMITY (MULTI): Status: ACTIVE | Noted: 2019-08-15
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
## 2023-05-01 PROBLEM — E11.621: Status: ACTIVE | Noted: 2020-07-12

## 2023-05-01 PROBLEM — S90.812A INFECTED ABRASION OF LEFT FOOT: Status: ACTIVE | Noted: 2018-12-17

## 2023-05-01 PROBLEM — L97.429: Status: ACTIVE | Noted: 2022-12-20

## 2023-05-01 PROBLEM — L97.411: Status: ACTIVE | Noted: 2019-10-02

## 2023-05-01 PROBLEM — M79.672 FOOT PAIN, LEFT: Status: ACTIVE | Noted: 2020-06-24

## 2023-05-01 PROBLEM — M25.562 PAIN IN LEFT KNEE: Status: ACTIVE | Noted: 2020-07-15

## 2023-05-01 PROBLEM — I50.23 ACUTE ON CHRONIC SYSTOLIC CHF (CONGESTIVE HEART FAILURE) (MULTI): Status: ACTIVE | Noted: 2020-05-15

## 2023-05-01 PROBLEM — R53.81 DEBILITY: Status: ACTIVE | Noted: 2022-04-09

## 2023-05-01 LAB
ALANINE AMINOTRANSFERASE (SGPT) (U/L) IN SER/PLAS: 50 U/L (ref 10–52)
ALBUMIN (G/DL) IN SER/PLAS: 3.8 G/DL (ref 3.4–5)
ALBUMIN (MG/L) IN URINE: 110.5 MG/L
ALBUMIN/CREATININE (UG/MG) IN URINE: 85.7 UG/MG CRT (ref 0–30)
ALKALINE PHOSPHATASE (U/L) IN SER/PLAS: 128 U/L (ref 33–136)
ANION GAP IN SER/PLAS: 13 MMOL/L (ref 10–20)
ASPARTATE AMINOTRANSFERASE (SGOT) (U/L) IN SER/PLAS: 49 U/L (ref 9–39)
BASOPHILS (10*3/UL) IN BLOOD BY AUTOMATED COUNT: 0.04 X10E9/L (ref 0–0.1)
BASOPHILS/100 LEUKOCYTES IN BLOOD BY AUTOMATED COUNT: 0.7 % (ref 0–2)
BILIRUBIN TOTAL (MG/DL) IN SER/PLAS: 0.7 MG/DL (ref 0–1.2)
CALCIUM (MG/DL) IN SER/PLAS: 9.2 MG/DL (ref 8.6–10.6)
CARBON DIOXIDE, TOTAL (MMOL/L) IN SER/PLAS: 23 MMOL/L (ref 21–32)
CHLORIDE (MMOL/L) IN SER/PLAS: 112 MMOL/L (ref 98–107)
CHOLESTEROL (MG/DL) IN SER/PLAS: 90 MG/DL (ref 0–199)
CHOLESTEROL IN HDL (MG/DL) IN SER/PLAS: 51 MG/DL
CHOLESTEROL/HDL RATIO: 1.8
CREATININE (MG/DL) IN SER/PLAS: 2.26 MG/DL (ref 0.5–1.3)
CREATININE (MG/DL) IN URINE: 129 MG/DL (ref 20–370)
EOSINOPHILS (10*3/UL) IN BLOOD BY AUTOMATED COUNT: 0.38 X10E9/L (ref 0–0.4)
EOSINOPHILS/100 LEUKOCYTES IN BLOOD BY AUTOMATED COUNT: 6.8 % (ref 0–6)
ERYTHROCYTE DISTRIBUTION WIDTH (RATIO) BY AUTOMATED COUNT: 15 % (ref 11.5–14.5)
ERYTHROCYTE MEAN CORPUSCULAR HEMOGLOBIN CONCENTRATION (G/DL) BY AUTOMATED: 31.3 G/DL (ref 32–36)
ERYTHROCYTE MEAN CORPUSCULAR VOLUME (FL) BY AUTOMATED COUNT: 90 FL (ref 80–100)
ERYTHROCYTES (10*6/UL) IN BLOOD BY AUTOMATED COUNT: 3.61 X10E12/L (ref 4.5–5.9)
ESTIMATED AVERAGE GLUCOSE FOR HBA1C: 151 MG/DL
GFR MALE: 30 ML/MIN/1.73M2
GLUCOSE (MG/DL) IN SER/PLAS: 137 MG/DL (ref 74–99)
HEMATOCRIT (%) IN BLOOD BY AUTOMATED COUNT: 32.6 % (ref 41–52)
HEMOGLOBIN (G/DL) IN BLOOD: 10.2 G/DL (ref 13.5–17.5)
HEMOGLOBIN A1C/HEMOGLOBIN TOTAL IN BLOOD: 6.9 %
IMMATURE GRANULOCYTES/100 LEUKOCYTES IN BLOOD BY AUTOMATED COUNT: 0.4 % (ref 0–0.9)
LDL: 25 MG/DL (ref 0–99)
LEUKOCYTES (10*3/UL) IN BLOOD BY AUTOMATED COUNT: 5.6 X10E9/L (ref 4.4–11.3)
LYMPHOCYTES (10*3/UL) IN BLOOD BY AUTOMATED COUNT: 2.12 X10E9/L (ref 0.8–3)
LYMPHOCYTES/100 LEUKOCYTES IN BLOOD BY AUTOMATED COUNT: 38.1 % (ref 13–44)
MONOCYTES (10*3/UL) IN BLOOD BY AUTOMATED COUNT: 0.4 X10E9/L (ref 0.05–0.8)
MONOCYTES/100 LEUKOCYTES IN BLOOD BY AUTOMATED COUNT: 7.2 % (ref 2–10)
NEUTROPHILS (10*3/UL) IN BLOOD BY AUTOMATED COUNT: 2.6 X10E9/L (ref 1.6–5.5)
NEUTROPHILS/100 LEUKOCYTES IN BLOOD BY AUTOMATED COUNT: 46.8 % (ref 40–80)
NRBC (PER 100 WBCS) BY AUTOMATED COUNT: 0 /100 WBC (ref 0–0)
PLATELETS (10*3/UL) IN BLOOD AUTOMATED COUNT: 199 X10E9/L (ref 150–450)
POTASSIUM (MMOL/L) IN SER/PLAS: 4.5 MMOL/L (ref 3.5–5.3)
PROTEIN TOTAL: 7 G/DL (ref 6.4–8.2)
SODIUM (MMOL/L) IN SER/PLAS: 143 MMOL/L (ref 136–145)
TRIGLYCERIDE (MG/DL) IN SER/PLAS: 70 MG/DL (ref 0–149)
UREA NITROGEN (MG/DL) IN SER/PLAS: 31 MG/DL (ref 6–23)
VLDL: 14 MG/DL (ref 0–40)

## 2023-05-01 PROCEDURE — 99212 OFFICE O/P EST SF 10 MIN: CPT | Performed by: STUDENT IN AN ORGANIZED HEALTH CARE EDUCATION/TRAINING PROGRAM

## 2023-05-01 PROCEDURE — 1159F MED LIST DOCD IN RCRD: CPT | Performed by: STUDENT IN AN ORGANIZED HEALTH CARE EDUCATION/TRAINING PROGRAM

## 2023-05-01 PROCEDURE — 3078F DIAST BP <80 MM HG: CPT | Performed by: STUDENT IN AN ORGANIZED HEALTH CARE EDUCATION/TRAINING PROGRAM

## 2023-05-01 PROCEDURE — 3074F SYST BP LT 130 MM HG: CPT | Performed by: STUDENT IN AN ORGANIZED HEALTH CARE EDUCATION/TRAINING PROGRAM

## 2023-05-01 RX ORDER — ACETAMINOPHEN 325 MG/1
650 TABLET ORAL
COMMUNITY
Start: 2022-06-27

## 2023-05-01 RX ORDER — UBIQUINOL 100 MG
CAPSULE ORAL
COMMUNITY
Start: 2023-03-25

## 2023-05-01 RX ORDER — GLIPIZIDE 10 MG/1
10 TABLET ORAL 2 TIMES DAILY
COMMUNITY
End: 2023-05-01 | Stop reason: ALTCHOICE

## 2023-05-01 RX ORDER — METOPROLOL TARTRATE 25 MG/1
TABLET, FILM COATED ORAL
COMMUNITY
Start: 2019-12-05 | End: 2023-05-08 | Stop reason: ALTCHOICE

## 2023-05-01 RX ORDER — AMOXICILLIN AND CLAVULANATE POTASSIUM 875; 125 MG/1; MG/1
1 TABLET, FILM COATED ORAL 2 TIMES DAILY
Qty: 14 TABLET | Refills: 0 | COMMUNITY
Start: 2023-04-14 | End: 2023-04-21

## 2023-05-01 RX ORDER — SILDENAFIL 100 MG/1
1 TABLET, FILM COATED ORAL AS NEEDED
COMMUNITY
Start: 2023-01-25

## 2023-05-01 RX ORDER — LIDOCAINE 50 MG/G
1 PATCH TOPICAL
COMMUNITY
Start: 2019-08-15

## 2023-05-01 RX ORDER — ONDANSETRON 4 MG/1
TABLET, ORALLY DISINTEGRATING ORAL
COMMUNITY
Start: 2023-04-18

## 2023-05-01 RX ORDER — OMEPRAZOLE 40 MG/1
CAPSULE, DELAYED RELEASE ORAL
COMMUNITY
End: 2023-11-09 | Stop reason: SDUPTHER

## 2023-05-01 ASSESSMENT — LIFESTYLE VARIABLES
SKIP TO QUESTIONS 9-10: 1
HOW OFTEN DO YOU HAVE A DRINK CONTAINING ALCOHOL: NEVER
HOW OFTEN DO YOU HAVE SIX OR MORE DRINKS ON ONE OCCASION: NEVER
HOW MANY STANDARD DRINKS CONTAINING ALCOHOL DO YOU HAVE ON A TYPICAL DAY: PATIENT DOES NOT DRINK
AUDIT-C TOTAL SCORE: 0

## 2023-05-01 ASSESSMENT — PATIENT HEALTH QUESTIONNAIRE - PHQ9
2. FEELING DOWN, DEPRESSED OR HOPELESS: NOT AT ALL
1. LITTLE INTEREST OR PLEASURE IN DOING THINGS: NOT AT ALL
SUM OF ALL RESPONSES TO PHQ9 QUESTIONS 1 & 2: 0

## 2023-05-01 NOTE — PATIENT INSTRUCTIONS
Continue with current medications.  Blood work done today   All the nonurgent lab results will be discussed with you at your next office visit.  Please arrive 15 minutes before your appointment.   Return to office in 3-4 months or as needed

## 2023-05-01 NOTE — PROGRESS NOTES
Subjective   Patient ID: Vasquez Gastelum is a pleasant 73 y.o. male who presents for Diabetes.  HPI  Patient is here for diabetes follow-up.  His last HbA1c was 7.3 on 3/20/2023.  He is compliant with his medications.  Blood pressure stable.    Reports that overall doing well.  Denies any significant concerns for today.  Recently lost his 34-year-old son due to diabetic coma.  States that he is handling his loss okay.  He has 3 other children who are helping him with his daily needs.    Review of Systems   All other systems reviewed and are negative.      Visit Vitals  /52   Pulse 88   Temp 36.6 °C (97.9 °F)   Resp 13          Objective   Physical Exam  Constitutional:       General: He is not in acute distress.     Appearance: Normal appearance.   HENT:      Head: Normocephalic and atraumatic.   Eyes:      General: No scleral icterus.     Conjunctiva/sclera: Conjunctivae normal.   Cardiovascular:      Rate and Rhythm: Normal rate and regular rhythm.      Heart sounds: Normal heart sounds.   Pulmonary:      Effort: Pulmonary effort is normal.      Breath sounds: Normal breath sounds. No wheezing.   Abdominal:      General: Bowel sounds are normal. There is no distension.      Palpations: Abdomen is soft.      Tenderness: There is no abdominal tenderness.   Musculoskeletal:      Cervical back: Neck supple.      Right lower leg: No edema.      Left lower leg: No edema.      Comments: Right leg prosthesis   Lymphadenopathy:      Cervical: No cervical adenopathy.   Skin:     General: Skin is warm and dry.   Neurological:      General: No focal deficit present.      Mental Status: He is alert and oriented to person, place, and time.   Psychiatric:         Mood and Affect: Mood normal.         Behavior: Behavior normal.         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

## 2023-05-02 ENCOUNTER — TELEPHONE (OUTPATIENT)
Dept: PRIMARY CARE | Facility: CLINIC | Age: 73
End: 2023-05-02
Payer: MEDICARE

## 2023-05-02 NOTE — TELEPHONE ENCOUNTER
----- Message from Tricia Arredondo MD sent at 5/1/2023  5:15 PM EDT -----  Please call the patient regarding his abnormal result.  Please let him know that his HbA1c has improved down to 6.9 which is at goal.  Also his kidney function is at baseline.  Continue with the current medications.

## 2023-05-08 ENCOUNTER — TELEMEDICINE (OUTPATIENT)
Dept: PHARMACY | Facility: HOSPITAL | Age: 73
End: 2023-05-08
Payer: MEDICARE

## 2023-05-08 DIAGNOSIS — E78.5 TYPE 2 DIABETES MELLITUS WITH HYPERLIPIDEMIA (MULTI): ICD-10-CM

## 2023-05-08 DIAGNOSIS — Z79.4 TYPE 2 DIABETES MELLITUS WITH HYPERGLYCEMIA, WITH LONG-TERM CURRENT USE OF INSULIN (MULTI): Primary | ICD-10-CM

## 2023-05-08 DIAGNOSIS — E11.69 TYPE 2 DIABETES MELLITUS WITH HYPERLIPIDEMIA (MULTI): ICD-10-CM

## 2023-05-08 DIAGNOSIS — E11.65 TYPE 2 DIABETES MELLITUS WITH HYPERGLYCEMIA, WITH LONG-TERM CURRENT USE OF INSULIN (MULTI): Primary | ICD-10-CM

## 2023-05-08 RX ORDER — DULAGLUTIDE 3 MG/.5ML
3 INJECTION, SOLUTION SUBCUTANEOUS
Qty: 2 ML | Refills: 2 | Status: SHIPPED | OUTPATIENT
Start: 2023-05-08 | End: 2023-06-05 | Stop reason: DRUGHIGH

## 2023-05-08 NOTE — PROGRESS NOTES
"Subjective     Patient ID: Vasquez Gastelum is a 73 y.o. male who presents for Diabetes.    Diabetes  He presents for his follow-up diabetic visit. He has type 2 diabetes mellitus. His disease course has been improving. There are no hypoglycemic associated symptoms. He is compliant with treatment all of the time. He never participates in exercise.       Allergies   Allergen Reactions    Tomato Fruit Extract Unknown    Nsaids (Non-Steroidal Anti-Inflammatory Drug) Unknown and Other     ckd    CKD    Other Rash     tomatoes    Tomato Itching, Other and Rash     \"spaghetti sauce\"   \"spaghetti sauce\"    \"spaghetti sauce\"       Objective       CURRENT DM PHARMACOTHERAPY  Trulicity 1.5 mg/0.5 mL- once weekly   Humalog 100 units/mL- 1-5 units TID per sliding scale - 1:50 over 150mg/dL- patient has not needed to use in several weeks       SECONDARY PREVENTION  - Statin? Yes  - ACE-I/ARB? No  - Aspirin? Yes    Current monitoring regimen:   Patient is using: glucometer    SMBG Readings: Reports few readings above 150 mg/dL which he corrects using SS Humalog     Any episodes of hypoglycemia? no  Hypoglycemia awareness? Yes    There were no vitals taken for this visit.    Pertinent PMH Review:  - PMH of Pancreatitis: No  - PMH/FH of Medullary Thyroid Cancer: No  - PMH of Retinopathy: No  - PMH of Urinary Tract Infections: No    Lab Review  Lab Results   Component Value Date    BILITOT 0.7 05/01/2023    CALCIUM 9.2 05/01/2023    CO2 23 05/01/2023     (H) 05/01/2023    CREATININE 2.26 (H) 05/01/2023    GLUCOSE 137 (H) 05/01/2023    ALKPHOS 128 05/01/2023    K 4.5 05/01/2023    PROT 7.0 05/01/2023     05/01/2023    AST 49 (H) 05/01/2023    ALT 50 05/01/2023    BUN 31 (H) 05/01/2023    ANIONGAP 13 05/01/2023    ALBUMIN 3.8 05/01/2023    GFRMALE 30 (A) 05/01/2023     Lab Results   Component Value Date    TRIG 70 05/01/2023    CHOL 90 05/01/2023    HDL 51.0 05/01/2023     Lab Results   Component Value Date    HGBA1C 6.9 " (A) 05/01/2023     The ASCVD Risk score (Camille DIAMOND, et al., 2019) failed to calculate for the following reasons:    The valid total cholesterol range is 130 to 320 mg/dL    Health Maintenance:   Foot Exam: Currently being seen regularly by podiatry for foot ulcer  Eye Exam: Unknown   Lipid Panel: 5/1/23  Urine Albumin: 5/1/23      Assessment/Plan     Problem List Items Addressed This Visit          Endocrine/Metabolic    Type 2 diabetes mellitus with hyperglycemia, with long-term current use of insulin (CMS/[facility]) - Primary     A1C, Lipid Panel, Urine Albumin all improved. Down 6 lbs since last office visit. No low BG.     INCREASE Trulicity to 3 mg once weekly   CONTINUE Humalog SS as needed TID           Other Visit Diagnoses       Type 2 diabetes mellitus with hyperlipidemia (CMS/[facility])                Type 2 diabetes mellitus, is at goal.     Follow up: I recommend diabetes care be 1 month.    Jodie Pacheco PharmD [facility]  Clinical Pharmacist

## 2023-05-08 NOTE — ASSESSMENT & PLAN NOTE
A1C, Lipid Panel, Urine Albumin all improved. Down 6 lbs since last office visit. No low BG.     INCREASE Trulicity to 3 mg once weekly   CONTINUE Humalog SS as needed TID

## 2023-06-05 ENCOUNTER — APPOINTMENT (OUTPATIENT)
Dept: PRIMARY CARE | Facility: CLINIC | Age: 73
End: 2023-06-05
Payer: MEDICARE

## 2023-06-05 ENCOUNTER — TELEMEDICINE (OUTPATIENT)
Dept: PHARMACY | Facility: HOSPITAL | Age: 73
End: 2023-06-05
Payer: MEDICARE

## 2023-06-05 DIAGNOSIS — E11.65 TYPE 2 DIABETES MELLITUS WITH HYPERGLYCEMIA, WITH LONG-TERM CURRENT USE OF INSULIN (MULTI): ICD-10-CM

## 2023-06-05 DIAGNOSIS — Z79.4 TYPE 2 DIABETES MELLITUS WITH HYPERGLYCEMIA, WITH LONG-TERM CURRENT USE OF INSULIN (MULTI): ICD-10-CM

## 2023-06-05 RX ORDER — PEN NEEDLE, DIABETIC 30 GX3/16"
NEEDLE, DISPOSABLE MISCELLANEOUS
Qty: 100 EACH | Refills: 3 | Status: SHIPPED | OUTPATIENT
Start: 2023-06-05

## 2023-06-05 RX ORDER — DULAGLUTIDE 4.5 MG/.5ML
0.75 INJECTION, SOLUTION SUBCUTANEOUS
Qty: 2 ML | Refills: 2 | Status: SHIPPED | OUTPATIENT
Start: 2023-06-05 | End: 2023-07-11 | Stop reason: SDUPTHER

## 2023-06-05 NOTE — PROGRESS NOTES
"Subjective     Patient ID: Vasquez Gastelum is a 73 y.o. male who presents for No chief complaint on file..    Diabetes  He presents for his follow-up diabetic visit. He has type 2 diabetes mellitus. His disease course has been stable. He is compliant with treatment all of the time.         Allergies   Allergen Reactions    Tomato Fruit Extract Unknown    Nsaids (Non-Steroidal Anti-Inflammatory Drug) Unknown and Other     ckd    CKD    Other Rash     tomatoes    Tomato Itching, Other and Rash     \"spaghetti sauce\"   \"spaghetti sauce\"    \"spaghetti sauce\"       Objective     Current DM Pharmacotherapy:   Trulicity 3 mg/0.5 mL - once weekly   Humalog KwikPen- SS PRN    SECONDARY PREVENTION  - Statin? Yes  - ACE-I/ARB? No  - Aspirin? Yes    Current monitoring regimen:   Patient is using: glucometer    SMBG Readings: At goal per patient, some elevated numbers treated with SS     Any episodes of hypoglycemia? No  Hypoglycemia awareness? Yes    There were no vitals taken for this visit.    Pertinent PMH Review:  - PMH of Pancreatitis: No  - PMH/FH of Medullary Thyroid Cancer: No  - PMH of Retinopathy: No  - PMH of Urinary Tract Infections: No    Lab Review  Lab Results   Component Value Date    BILITOT 0.7 05/01/2023    CALCIUM 9.2 05/01/2023    CO2 23 05/01/2023     (H) 05/01/2023    CREATININE 2.26 (H) 05/01/2023    GLUCOSE 137 (H) 05/01/2023    ALKPHOS 128 05/01/2023    K 4.5 05/01/2023    PROT 7.0 05/01/2023     05/01/2023    AST 49 (H) 05/01/2023    ALT 50 05/01/2023    BUN 31 (H) 05/01/2023    ANIONGAP 13 05/01/2023    ALBUMIN 3.8 05/01/2023    GFRMALE 30 (A) 05/01/2023     Lab Results   Component Value Date    TRIG 70 05/01/2023    CHOL 90 05/01/2023    HDL 51.0 05/01/2023     Lab Results   Component Value Date    HGBA1C 6.9 (A) 05/01/2023     The ASCVD Risk score (Camille DIAMOND, et al., 2019) failed to calculate for the following reasons:    The valid total cholesterol range is 130 to 320 " mg/dL      Assessment/Plan     Problem List Items Addressed This Visit          Endocrine/Metabolic    Type 2 diabetes mellitus with hyperglycemia, with long-term current use of insulin (CMS/[facility])     No ADEs with dose increase on Trulicity. Has had some elevated BG which he corrected with fast acting insulin. No lows.     INCREASE to Trulicity 4.5 mg/0.5 mL - after finishing box of 3 mg dose   CONTINUE:   Humalog KwikPen U-100 - SS           Pen needle refill sent to [facility] to mail new Trulicity dose.     Type 2 diabetes mellitus, is at goal. Goal A1C <7%    Follow up: I recommend diabetes care be 4 weeks.    Jodie Pacheco PharmD [facility]  Clinical Pharmacist     Continue all meds under the continuation of care with the referring provider and clinical pharmacy team

## 2023-06-05 NOTE — ASSESSMENT & PLAN NOTE
No ADEs with dose increase on Trulicity. Has had some elevated BG which he corrected with fast acting insulin. No lows.     INCREASE to Trulicity 4.5 mg/0.5 mL - after finishing box of 3 mg dose   CONTINUE:   Humalog KwikPen U-100 - SS

## 2023-06-08 ENCOUNTER — OFFICE VISIT (OUTPATIENT)
Dept: PRIMARY CARE | Facility: CLINIC | Age: 73
End: 2023-06-08
Payer: MEDICARE

## 2023-06-08 VITALS
WEIGHT: 262 LBS | BODY MASS INDEX: 38.8 KG/M2 | TEMPERATURE: 96 F | HEIGHT: 69 IN | RESPIRATION RATE: 12 BRPM | OXYGEN SATURATION: 94 % | DIASTOLIC BLOOD PRESSURE: 68 MMHG | HEART RATE: 75 BPM | SYSTOLIC BLOOD PRESSURE: 110 MMHG

## 2023-06-08 DIAGNOSIS — E11.49 DM TYPE 2 CAUSING NEUROLOGICAL DISEASE (MULTI): Primary | ICD-10-CM

## 2023-06-08 PROBLEM — L89.620: Status: ACTIVE | Noted: 2023-04-24

## 2023-06-08 PROBLEM — I50.22 CHRONIC SYSTOLIC HEART FAILURE (MULTI): Status: ACTIVE | Noted: 2020-10-09

## 2023-06-08 PROBLEM — Z89.511: Status: ACTIVE | Noted: 2019-08-15

## 2023-06-08 PROBLEM — R07.9 CHEST PAIN: Status: ACTIVE | Noted: 2018-03-21

## 2023-06-08 PROCEDURE — 99212 OFFICE O/P EST SF 10 MIN: CPT | Performed by: STUDENT IN AN ORGANIZED HEALTH CARE EDUCATION/TRAINING PROGRAM

## 2023-06-08 PROCEDURE — 1160F RVW MEDS BY RX/DR IN RCRD: CPT | Performed by: STUDENT IN AN ORGANIZED HEALTH CARE EDUCATION/TRAINING PROGRAM

## 2023-06-08 PROCEDURE — 3078F DIAST BP <80 MM HG: CPT | Performed by: STUDENT IN AN ORGANIZED HEALTH CARE EDUCATION/TRAINING PROGRAM

## 2023-06-08 PROCEDURE — 1159F MED LIST DOCD IN RCRD: CPT | Performed by: STUDENT IN AN ORGANIZED HEALTH CARE EDUCATION/TRAINING PROGRAM

## 2023-06-08 PROCEDURE — 3074F SYST BP LT 130 MM HG: CPT | Performed by: STUDENT IN AN ORGANIZED HEALTH CARE EDUCATION/TRAINING PROGRAM

## 2023-06-08 PROCEDURE — 3044F HG A1C LEVEL LT 7.0%: CPT | Performed by: STUDENT IN AN ORGANIZED HEALTH CARE EDUCATION/TRAINING PROGRAM

## 2023-06-08 ASSESSMENT — LIFESTYLE VARIABLES
HOW OFTEN DO YOU HAVE SIX OR MORE DRINKS ON ONE OCCASION: NEVER
AUDIT-C TOTAL SCORE: 1
HOW OFTEN DO YOU HAVE A DRINK CONTAINING ALCOHOL: MONTHLY OR LESS
SKIP TO QUESTIONS 9-10: 1
HOW MANY STANDARD DRINKS CONTAINING ALCOHOL DO YOU HAVE ON A TYPICAL DAY: 1 OR 2

## 2023-06-08 ASSESSMENT — PATIENT HEALTH QUESTIONNAIRE - PHQ9
SUM OF ALL RESPONSES TO PHQ9 QUESTIONS 1 & 2: 0
1. LITTLE INTEREST OR PLEASURE IN DOING THINGS: NOT AT ALL
2. FEELING DOWN, DEPRESSED OR HOPELESS: NOT AT ALL

## 2023-06-08 NOTE — PATIENT INSTRUCTIONS
Continue with current medications.  Blood work at your next visit.  All the nonurgent lab results will be discussed with you at your next office visit.  Please arrive 15 minutes before your appointment.   Return to office in 3 months or as needed

## 2023-06-08 NOTE — PROGRESS NOTES
Subjective   Patient ID: Vasquez Gastelum is a 73 y.o. male who presents for Diabetes.  HPI  Patient is here for follow-up on diabetes.  Reports that overall doing well.  Reports that his blood glucose level is stable at home typically running around 100-1 20s.  Does not have any hypoglycemic episodes. Trulicity recently increased to 4.5 mg.  Denies any significant concerns for today.  Compliant with the medications and does not need any refills today.  Review of Systems   All other systems reviewed and are negative.      Visit Vitals  /68   Pulse 75   Temp 35.6 °C (96 °F)   Resp 12          Objective   Physical Exam  Constitutional:       General: He is not in acute distress.     Appearance: Normal appearance.   HENT:      Head: Normocephalic and atraumatic.   Eyes:      General: No scleral icterus.     Conjunctiva/sclera: Conjunctivae normal.   Cardiovascular:      Rate and Rhythm: Normal rate and regular rhythm.      Heart sounds: Normal heart sounds.   Pulmonary:      Effort: Pulmonary effort is normal.      Breath sounds: Normal breath sounds. No wheezing.   Abdominal:      General: Bowel sounds are normal. There is no distension.      Palpations: Abdomen is soft.      Tenderness: There is no abdominal tenderness.   Musculoskeletal:      Cervical back: Neck supple.      Right lower leg: No edema.      Left lower leg: No edema.      Comments: Prosthetic leg on the right   Lymphadenopathy:      Cervical: No cervical adenopathy.   Skin:     General: Skin is warm and dry.   Neurological:      General: No focal deficit present.      Mental Status: He is alert and oriented to person, place, and time.   Psychiatric:         Mood and Affect: Mood normal.         Behavior: Behavior normal.         Assessment/Plan   Problem List Items Addressed This Visit          Nervous    DM type 2 causing neurological disease (CMS/HCC) - Primary     Improved  HbA1c 6.9  Follows up with our clinical pharmacist  Continue with  current medications

## 2023-07-10 ENCOUNTER — TELEMEDICINE (OUTPATIENT)
Dept: PHARMACY | Facility: HOSPITAL | Age: 73
End: 2023-07-10
Payer: MEDICARE

## 2023-07-10 DIAGNOSIS — Z79.4 TYPE 2 DIABETES MELLITUS WITH HYPERGLYCEMIA, WITH LONG-TERM CURRENT USE OF INSULIN (MULTI): ICD-10-CM

## 2023-07-10 DIAGNOSIS — E11.65 TYPE 2 DIABETES MELLITUS WITH HYPERGLYCEMIA, WITH LONG-TERM CURRENT USE OF INSULIN (MULTI): ICD-10-CM

## 2023-07-11 RX ORDER — DULAGLUTIDE 4.5 MG/.5ML
4.5 INJECTION, SOLUTION SUBCUTANEOUS
Qty: 2 ML | Refills: 3 | Status: SHIPPED | OUTPATIENT
Start: 2023-07-11 | End: 2023-07-11

## 2023-07-11 NOTE — PROGRESS NOTES
"Subjective     Patient ID: Vasquez Gastelum is a 73 y.o. male who presents for Diabetes.    Diabetes  He presents for his follow-up diabetic visit. He has type 2 diabetes mellitus. His disease course has been stable. He is compliant with treatment all of the time.         Allergies   Allergen Reactions    Tomato Fruit Extract Unknown    Nsaids (Non-Steroidal Anti-Inflammatory Drug) Other and Unknown     ckd    CKD    Other Rash     tomatoes    Regadenoson Rash    Tomato Itching, Other and Rash     \"spaghetti sauce\"   \"spaghetti sauce\"    \"spaghetti sauce\"       Objective     Current DM Pharmacotherapy:   Trulicity 3 mg/0.5 mL - once weekly   Humalog KwikPen- SS PRN    SECONDARY PREVENTION  - Statin? Yes  - ACE-I/ARB? No  - Aspirin? Yes    Current monitoring regimen:   Patient is using: glucometer    SMBG Readings: At goal per patient, has not needed SS insulin    Any episodes of hypoglycemia? No  Hypoglycemia awareness? Yes    There were no vitals taken for this visit.    Pertinent PMH Review:  - PMH of Pancreatitis: No  - PMH/FH of Medullary Thyroid Cancer: No  - PMH of Retinopathy: No  - PMH of Urinary Tract Infections: No    Lab Review  Lab Results   Component Value Date    BILITOT 0.7 05/01/2023    CALCIUM 9.2 05/01/2023    CO2 23 05/01/2023     (H) 05/01/2023    CREATININE 2.26 (H) 05/01/2023    GLUCOSE 137 (H) 05/01/2023    ALKPHOS 128 05/01/2023    K 4.5 05/01/2023    PROT 7.0 05/01/2023     05/01/2023    AST 49 (H) 05/01/2023    ALT 50 05/01/2023    BUN 31 (H) 05/01/2023    ANIONGAP 13 05/01/2023    ALBUMIN 3.8 05/01/2023    GFRMALE 30 (A) 05/01/2023     Lab Results   Component Value Date    TRIG 70 05/01/2023    CHOL 90 05/01/2023    HDL 51.0 05/01/2023     Lab Results   Component Value Date    HGBA1C 6.9 (A) 05/01/2023     The ASCVD Risk score (Camille DIAMOND, et al., 2019) failed to calculate for the following reasons:    The valid total cholesterol range is 130 to 320 mg/dL      Assessment/Plan "     Problem List Items Addressed This Visit       Type 2 diabetes mellitus with hyperglycemia, with long-term current use of insulin (CMS/[facility])     Well controlled. Had not yet received Trulciity 4.5 mg. Will resend today. Has not needed to use SS in some times.     INCREASE to Trulicity 4.5 mg/0.5 mL   CONTINUE:   Humalog KwikPen U-100 - SS as needed          Pen needle refill sent to [facility] to mail new Trulicity dose.     Type 2 diabetes mellitus, is at goal. Goal A1C <7%    Follow up: I recommend diabetes care be as needed.    Jodie Pacheco PharmD [facility]  Clinical Pharmacist     Continue all meds under the continuation of care with the referring provider and clinical pharmacy team

## 2023-07-11 NOTE — ASSESSMENT & PLAN NOTE
Well controlled. Had not yet received Trulciity 4.5 mg. Will resend today. Has not needed to use SS in some times.     INCREASE to Trulicity 4.5 mg/0.5 mL   CONTINUE:   Humalog KwikPen U-100 - SS as needed

## 2023-08-17 ENCOUNTER — OFFICE VISIT (OUTPATIENT)
Dept: PRIMARY CARE | Facility: CLINIC | Age: 73
End: 2023-08-17
Payer: MEDICARE

## 2023-08-17 ENCOUNTER — LAB (OUTPATIENT)
Dept: LAB | Facility: LAB | Age: 73
End: 2023-08-17
Payer: MEDICARE

## 2023-08-17 VITALS
OXYGEN SATURATION: 99 % | HEART RATE: 83 BPM | RESPIRATION RATE: 16 BRPM | DIASTOLIC BLOOD PRESSURE: 76 MMHG | BODY MASS INDEX: 37.03 KG/M2 | HEIGHT: 69 IN | SYSTOLIC BLOOD PRESSURE: 138 MMHG | WEIGHT: 250 LBS | TEMPERATURE: 97 F

## 2023-08-17 DIAGNOSIS — R05.1 ACUTE COUGH: Primary | ICD-10-CM

## 2023-08-17 DIAGNOSIS — E11.49 DM TYPE 2 CAUSING NEUROLOGICAL DISEASE (MULTI): ICD-10-CM

## 2023-08-17 DIAGNOSIS — R61: ICD-10-CM

## 2023-08-17 DIAGNOSIS — J06.9 VIRAL UPPER RESPIRATORY TRACT INFECTION: ICD-10-CM

## 2023-08-17 LAB
BASOPHILS (10*3/UL) IN BLOOD BY AUTOMATED COUNT: 0.03 X10E9/L (ref 0–0.1)
BASOPHILS/100 LEUKOCYTES IN BLOOD BY AUTOMATED COUNT: 0.5 % (ref 0–2)
EOSINOPHILS (10*3/UL) IN BLOOD BY AUTOMATED COUNT: 0.16 X10E9/L (ref 0–0.4)
EOSINOPHILS/100 LEUKOCYTES IN BLOOD BY AUTOMATED COUNT: 2.9 % (ref 0–6)
ERYTHROCYTE DISTRIBUTION WIDTH (RATIO) BY AUTOMATED COUNT: 15.9 % (ref 11.5–14.5)
ERYTHROCYTE MEAN CORPUSCULAR HEMOGLOBIN CONCENTRATION (G/DL) BY AUTOMATED: 32.3 G/DL (ref 32–36)
ERYTHROCYTE MEAN CORPUSCULAR VOLUME (FL) BY AUTOMATED COUNT: 90 FL (ref 80–100)
ERYTHROCYTES (10*6/UL) IN BLOOD BY AUTOMATED COUNT: 3.43 X10E12/L (ref 4.5–5.9)
ESTIMATED AVERAGE GLUCOSE FOR HBA1C: 137 MG/DL
HEMATOCRIT (%) IN BLOOD BY AUTOMATED COUNT: 31 % (ref 41–52)
HEMOGLOBIN (G/DL) IN BLOOD: 10 G/DL (ref 13.5–17.5)
HEMOGLOBIN A1C/HEMOGLOBIN TOTAL IN BLOOD: 6.4 %
IMMATURE GRANULOCYTES/100 LEUKOCYTES IN BLOOD BY AUTOMATED COUNT: 0.4 % (ref 0–0.9)
LEUKOCYTES (10*3/UL) IN BLOOD BY AUTOMATED COUNT: 5.6 X10E9/L (ref 4.4–11.3)
LYMPHOCYTES (10*3/UL) IN BLOOD BY AUTOMATED COUNT: 1.87 X10E9/L (ref 0.8–3)
LYMPHOCYTES/100 LEUKOCYTES IN BLOOD BY AUTOMATED COUNT: 33.3 % (ref 13–44)
MONOCYTES (10*3/UL) IN BLOOD BY AUTOMATED COUNT: 0.42 X10E9/L (ref 0.05–0.8)
MONOCYTES/100 LEUKOCYTES IN BLOOD BY AUTOMATED COUNT: 7.5 % (ref 2–10)
NEUTROPHILS (10*3/UL) IN BLOOD BY AUTOMATED COUNT: 3.11 X10E9/L (ref 1.6–5.5)
NEUTROPHILS/100 LEUKOCYTES IN BLOOD BY AUTOMATED COUNT: 55.4 % (ref 40–80)
NRBC (PER 100 WBCS) BY AUTOMATED COUNT: 0 /100 WBC (ref 0–0)
PLATELETS (10*3/UL) IN BLOOD AUTOMATED COUNT: 190 X10E9/L (ref 150–450)
THYROTROPIN (MIU/L) IN SER/PLAS BY DETECTION LIMIT <= 0.05 MIU/L: 0.64 MIU/L (ref 0.44–3.98)

## 2023-08-17 PROCEDURE — 83036 HEMOGLOBIN GLYCOSYLATED A1C: CPT

## 2023-08-17 PROCEDURE — 85025 COMPLETE CBC W/AUTO DIFF WBC: CPT

## 2023-08-17 PROCEDURE — 99214 OFFICE O/P EST MOD 30 MIN: CPT | Performed by: STUDENT IN AN ORGANIZED HEALTH CARE EDUCATION/TRAINING PROGRAM

## 2023-08-17 PROCEDURE — 1160F RVW MEDS BY RX/DR IN RCRD: CPT | Performed by: STUDENT IN AN ORGANIZED HEALTH CARE EDUCATION/TRAINING PROGRAM

## 2023-08-17 PROCEDURE — 84443 ASSAY THYROID STIM HORMONE: CPT

## 2023-08-17 PROCEDURE — 3075F SYST BP GE 130 - 139MM HG: CPT | Performed by: STUDENT IN AN ORGANIZED HEALTH CARE EDUCATION/TRAINING PROGRAM

## 2023-08-17 PROCEDURE — 36415 COLL VENOUS BLD VENIPUNCTURE: CPT

## 2023-08-17 PROCEDURE — 3044F HG A1C LEVEL LT 7.0%: CPT | Performed by: STUDENT IN AN ORGANIZED HEALTH CARE EDUCATION/TRAINING PROGRAM

## 2023-08-17 PROCEDURE — 3078F DIAST BP <80 MM HG: CPT | Performed by: STUDENT IN AN ORGANIZED HEALTH CARE EDUCATION/TRAINING PROGRAM

## 2023-08-17 PROCEDURE — 1159F MED LIST DOCD IN RCRD: CPT | Performed by: STUDENT IN AN ORGANIZED HEALTH CARE EDUCATION/TRAINING PROGRAM

## 2023-08-17 RX ORDER — AZITHROMYCIN 250 MG/1
TABLET, FILM COATED ORAL
Qty: 6 TABLET | Refills: 0 | Status: SHIPPED | OUTPATIENT
Start: 2023-08-17 | End: 2023-08-17

## 2023-08-17 RX ORDER — GUAIFENESIN 400 MG/1
400 TABLET ORAL EVERY 4 HOURS PRN
Qty: 30 TABLET | Refills: 2 | Status: SHIPPED | OUTPATIENT
Start: 2023-08-17 | End: 2023-08-17

## 2023-08-17 RX ORDER — INSULIN GLARGINE 100 [IU]/ML
INJECTION, SOLUTION SUBCUTANEOUS
Qty: 3 ML | Refills: 1 | Status: SHIPPED | OUTPATIENT
Start: 2023-08-17 | End: 2023-08-17

## 2023-08-17 RX ORDER — INSULIN GLARGINE 100 [IU]/ML
INJECTION, SOLUTION SUBCUTANEOUS
COMMUNITY
Start: 2022-12-28 | End: 2023-08-17 | Stop reason: SDUPTHER

## 2023-08-17 RX ORDER — BENZONATATE 200 MG/1
200 CAPSULE ORAL 3 TIMES DAILY PRN
Qty: 42 CAPSULE | Refills: 0 | Status: SHIPPED | OUTPATIENT
Start: 2023-08-17 | End: 2023-08-17

## 2023-08-17 ASSESSMENT — LIFESTYLE VARIABLES
AUDIT-C TOTAL SCORE: 0
HOW OFTEN DO YOU HAVE SIX OR MORE DRINKS ON ONE OCCASION: NEVER
HOW MANY STANDARD DRINKS CONTAINING ALCOHOL DO YOU HAVE ON A TYPICAL DAY: PATIENT DOES NOT DRINK
HOW OFTEN DO YOU HAVE A DRINK CONTAINING ALCOHOL: NEVER
SKIP TO QUESTIONS 9-10: 1

## 2023-08-17 ASSESSMENT — ENCOUNTER SYMPTOMS
SWEATS: 1
HEADACHES: 0
FEVER: 0
CHILLS: 0
SHORTNESS OF BREATH: 0
WHEEZING: 0
COUGH: 1
RHINORRHEA: 1

## 2023-08-17 NOTE — PATIENT INSTRUCTIONS
Blood work and CXR today  Education sent to [facility]  Depending on your blood work results we might consider obtaining a CT head

## 2023-08-17 NOTE — PROGRESS NOTES
"Subjective   Patient ID: Vasquez Gastelum is a pleasant 73 y.o. male with history of diabetes, CHF with reduced ejection fraction, (EF 55% 2022) hyperlipidemia, CAD, peripheral artery disease, right lower extremity amputation who presents for Cough.  Cough  This is a new problem. The current episode started in the past 7 days. The problem has been unchanged. The cough is Productive of sputum (clear phlegm). Associated symptoms include nasal congestion, postnasal drip, rhinorrhea and sweats. Pertinent negatives include no chest pain, chills, ear congestion, ear pain, fever, headaches, shortness of breath or wheezing. The symptoms are aggravated by cold air. He has tried OTC cough suppressant for the symptoms.     He also reports that for couple weeks he has been having sweating on the left side of face. He reports that his symptoms feel more as \"feeling sweating on the left side of face\". He has not noted actual diaphoresis and sweat his skin.     Review of Systems   Constitutional:  Negative for chills and fever.   HENT:  Positive for postnasal drip and rhinorrhea. Negative for ear pain.    Respiratory:  Positive for cough. Negative for shortness of breath and wheezing.    Cardiovascular:  Negative for chest pain.   Neurological:  Negative for headaches.   All other systems reviewed and are negative.      Visit Vitals  /76   Pulse 83   Temp 36.1 °C (97 °F)   Resp 16          Objective   Physical Exam  Constitutional:       General: He is not in acute distress.     Appearance: Normal appearance.   HENT:      Head: Normocephalic and atraumatic.   Eyes:      General: No scleral icterus.     Conjunctiva/sclera: Conjunctivae normal.   Cardiovascular:      Rate and Rhythm: Normal rate and regular rhythm.      Heart sounds: Normal heart sounds.   Pulmonary:      Effort: Pulmonary effort is normal.      Breath sounds: Normal breath sounds. No wheezing.   Abdominal:      General: Bowel sounds are normal. There is no " distension.      Palpations: Abdomen is soft.      Tenderness: There is no abdominal tenderness.   Musculoskeletal:      Cervical back: Neck supple.      Right lower leg: No edema.      Left lower leg: No edema.   Lymphadenopathy:      Cervical: No cervical adenopathy.   Skin:     General: Skin is warm and dry.   Neurological:      General: No focal deficit present.      Mental Status: He is alert and oriented to person, place, and time.   Psychiatric:         Mood and Affect: Mood normal.         Behavior: Behavior normal.         Assessment/Plan   Problem List Items Addressed This Visit       DM type 2 causing neurological disease (CMS/[facility])    Relevant Medications    insulin glargine (Lantus Solostar U-100 Insulin) 100 unit/mL (3 mL) pen    Other Relevant Orders    Hemoglobin A1C     Other Visit Diagnoses       Acute cough    -  Primary    Relevant Medications    azithromycin (Zithromax) 250 mg tablet    guaiFENesin (Mucus Relief) 400 mg tablet    benzonatate (Tessalon) 200 mg capsule    Other Relevant Orders    XR chest 2 views    Viral upper respiratory tract infection        Relevant Orders    XR chest 2 views    Sweating on left half of body        Relevant Orders    TSH with reflex to Free T4 if abnormal    Hemoglobin A1C    CBC and Auto Differential

## 2023-10-02 ENCOUNTER — PHARMACY VISIT (OUTPATIENT)
Dept: PHARMACY | Facility: CLINIC | Age: 73
End: 2023-10-02
Payer: MEDICARE

## 2023-10-02 PROCEDURE — RXMED WILLOW AMBULATORY MEDICATION CHARGE

## 2023-10-11 DIAGNOSIS — M1A.0720 CHRONIC GOUT OF LEFT FOOT, UNSPECIFIED CAUSE: Primary | ICD-10-CM

## 2023-10-11 NOTE — TELEPHONE ENCOUNTER
Dr. Arredondo's patient. Rx refill. Out of medicine.  allopurinol (Zyloprim) 100 mg tablet  [facility]/pharmacy #[id number] - [place], [place] - [address]   Phone: [phone]   Fax: [phone]

## 2023-10-12 RX ORDER — ALLOPURINOL 100 MG/1
50 TABLET ORAL DAILY
Qty: 90 TABLET | Refills: 0 | Status: SHIPPED | OUTPATIENT
Start: 2023-10-12 | End: 2024-04-22

## 2023-10-12 RX ORDER — PREDNISONE 20 MG/1
20 TABLET ORAL DAILY
Qty: 5 TABLET | Refills: 0 | Status: SHIPPED | OUTPATIENT
Start: 2023-10-12 | End: 2023-10-17

## 2023-10-12 NOTE — TELEPHONE ENCOUNTER
Spoke with patient. He said he is having a gout attack. Dr. Arredondo prescribed Prednisone and Allopurinol 50 mg. She said since the patient's kidney level was at 30 she wants him to do Allopurinol 50 mg instead of his normal 100 mg. Patient understood to complete the Prednisone before starting the Allopurinol or it will make his gout attack worse. Told the patient to call the office if he has any questions once he picks up the medication.

## 2023-10-12 NOTE — TELEPHONE ENCOUNTER
Patient called 10/12/23 concerning RX refill Dr. Arredondo's patient. Rx refill. Out of medicine.  allopurinol (Zyloprim) 100 mg tablet, Patient also says that his legs are swollen   [facility]/pharmacy #[id number] - [place], [place] - [address]   Phone: [phone]   Fax: [phone]

## 2023-10-30 ENCOUNTER — TELEPHONE (OUTPATIENT)
Dept: PRIMARY CARE | Facility: CLINIC | Age: 73
End: 2023-10-30
Payer: MEDICARE

## 2023-10-30 DIAGNOSIS — Z79.4 TYPE 2 DIABETES MELLITUS WITH HYPERGLYCEMIA, WITH LONG-TERM CURRENT USE OF INSULIN (MULTI): Primary | ICD-10-CM

## 2023-10-30 DIAGNOSIS — E11.65 TYPE 2 DIABETES MELLITUS WITH HYPERGLYCEMIA, WITH LONG-TERM CURRENT USE OF INSULIN (MULTI): Primary | ICD-10-CM

## 2023-10-30 DIAGNOSIS — K21.9 GERD WITHOUT ESOPHAGITIS: ICD-10-CM

## 2023-10-30 NOTE — TELEPHONE ENCOUNTER
Patient called in and states his numbers is high diabetic  and his medication needs to be adjusted could someone please reach out to him at [phone]

## 2023-11-13 ENCOUNTER — TELEMEDICINE (OUTPATIENT)
Dept: PHARMACY | Facility: HOSPITAL | Age: 73
End: 2023-11-13
Payer: MEDICARE

## 2023-11-13 DIAGNOSIS — E11.65 TYPE 2 DIABETES MELLITUS WITH HYPERGLYCEMIA, WITH LONG-TERM CURRENT USE OF INSULIN (MULTI): ICD-10-CM

## 2023-11-13 DIAGNOSIS — Z79.4 TYPE 2 DIABETES MELLITUS WITH HYPERGLYCEMIA, WITH LONG-TERM CURRENT USE OF INSULIN (MULTI): ICD-10-CM

## 2023-11-13 NOTE — PROGRESS NOTES
"Subjective     Patient ID: Vasquez Gastelum is a 73 y.o. male who presents for Diabetes.    Referring Provider: Tricia Arredondo MD     Diabetes  He presents for his follow-up diabetic visit. He has type 2 diabetes mellitus. His disease course has been stable. There are no hypoglycemic associated symptoms. There are no hypoglycemic complications. He is compliant with treatment most of the time.       Allergies   Allergen Reactions    Tomato Fruit Extract Unknown    Nsaids (Non-Steroidal Anti-Inflammatory Drug) Other and Unknown     ckd    CKD    Other Rash     tomatoes    Regadenoson Rash    Tomato Itching, Other and Rash     \"spaghetti sauce\"   \"spaghetti sauce\"    \"spaghetti sauce\"       Objective     Current DM Pharmacotherapy:   Humalog -200 2 units 50+ add 2 units  Lantus 8 units  Trulicity 4.5 weekly      SECONDARY PREVENTION  - Statin? Yes  - ACE-I/ARB? No  - Aspirin? No    Current monitoring regimen:   Patient is using: glucometer    SMBG Readings: Patient does not have log of blood sugars with them for this appointment. These numbers were based on memory.  Fastin-130s today was 116  Before meals: 150-200    Any episodes of hypoglycemia? No  Hypoglycemia awareness? Yes      Pertinent PMH Review:  - PMH of Pancreatitis: No  - PMH/FH of Medullary Thyroid Cancer: No  - PMH of Retinopathy: No  - PMH of Urinary Tract Infections: No    Lab Review  Lab Results   Component Value Date    BILITOT 0.7 2023    CALCIUM 9.2 2023    CO2 23 2023     (H) 2023    CREATININE 2.26 (H) 2023    GLUCOSE 137 (H) 2023    ALKPHOS 128 2023    K 4.5 2023    PROT 7.0 2023     2023    AST 49 (H) 2023    ALT 50 2023    BUN 31 (H) 2023    ANIONGAP 13 2023    ALBUMIN 3.8 2023    GFRMALE 30 (A) 2023     Lab Results   Component Value Date    TRIG 70 2023    CHOL 90 2023    HDL 51.0 2023     Lab Results "   Component Value Date    HGBA1C 6.4 (A) 2023     The ASCVD Risk score (Camille DK, et al., 2019) failed to calculate for the following reasons:    The valid total cholesterol range is 130 to 320 mg/dL      Assessment/Plan     Problem List Items Addressed This Visit       Type 2 diabetes mellitus with hyperglycemia, with long-term current use of insulin (CMS/[facility])     Continue currently insulin regimen.   Patient reports that when they use their mealtime insulin that are rarely using more than 2 units, and some meals during the week they will not need to us it.  Uses Lantus dose at night.  Patient reports not side effects from Trulicity. Currently eating 2 meals a day.  Patient was asked to keep a notebook near glucometer to better log blood sugar numbers.         Relevant Orders    Follow Up In [facility] - Pharmacy     Patient Goals  - Fasting B - 130 mg/dL  - Postprandial BG: less than 180 mg/dL  - A1c: less than 7%     Type 2 diabetes mellitus, is at goal A1C<7%. Recent A1C of 6.3% on     Follow up: I recommend diabetes care be 5 weeks.    Jorge Vieira PharmD  PGY-1 Resident    Continue all meds under the continuation of care with the referring provider and clinical pharmacy team

## 2023-11-13 NOTE — ASSESSMENT & PLAN NOTE
Continue currently insulin regimen.   Patient reports that when they use their mealtime insulin that are rarely using more than 2 units, and some meals during the week they will not need to us it.  Uses Lantus dose at night.  Patient reports not side effects from Trulicity. Currently eating 2 meals a day.  Patient was asked to keep a notebook near glucometer to better log blood sugar numbers.

## 2023-11-14 RX ORDER — OMEPRAZOLE 40 MG/1
CAPSULE, DELAYED RELEASE ORAL
Qty: 90 CAPSULE | Refills: 1 | Status: SHIPPED | OUTPATIENT
Start: 2023-11-14 | End: 2023-12-07

## 2023-11-15 DIAGNOSIS — E11.65 TYPE 2 DIABETES MELLITUS WITH HYPERGLYCEMIA, WITH LONG-TERM CURRENT USE OF INSULIN (MULTI): ICD-10-CM

## 2023-11-15 DIAGNOSIS — Z79.4 TYPE 2 DIABETES MELLITUS WITH HYPERGLYCEMIA, WITH LONG-TERM CURRENT USE OF INSULIN (MULTI): ICD-10-CM

## 2023-11-16 ENCOUNTER — PHARMACY VISIT (OUTPATIENT)
Dept: PHARMACY | Facility: CLINIC | Age: 73
End: 2023-11-16
Payer: MEDICARE

## 2023-11-16 PROCEDURE — RXMED WILLOW AMBULATORY MEDICATION CHARGE

## 2023-11-16 RX ORDER — DULAGLUTIDE 4.5 MG/.5ML
INJECTION, SOLUTION SUBCUTANEOUS
Qty: 2 ML | Refills: 3 | Status: SHIPPED | OUTPATIENT
Start: 2023-11-16 | End: 2024-05-14 | Stop reason: SDUPTHER

## 2023-12-06 DIAGNOSIS — K21.9 GERD WITHOUT ESOPHAGITIS: ICD-10-CM

## 2023-12-07 RX ORDER — OMEPRAZOLE 40 MG/1
CAPSULE, DELAYED RELEASE ORAL
Qty: 270 CAPSULE | Refills: 1 | Status: SHIPPED | OUTPATIENT
Start: 2023-12-07

## 2023-12-18 ENCOUNTER — TELEMEDICINE (OUTPATIENT)
Dept: PHARMACY | Facility: HOSPITAL | Age: 73
End: 2023-12-18
Payer: MEDICARE

## 2023-12-18 DIAGNOSIS — E11.65 TYPE 2 DIABETES MELLITUS WITH HYPERGLYCEMIA, WITH LONG-TERM CURRENT USE OF INSULIN (MULTI): ICD-10-CM

## 2023-12-18 DIAGNOSIS — Z79.4 TYPE 2 DIABETES MELLITUS WITH HYPERGLYCEMIA, WITH LONG-TERM CURRENT USE OF INSULIN (MULTI): ICD-10-CM

## 2023-12-18 NOTE — ASSESSMENT & PLAN NOTE
Patient reports he is currently in a [facility] rehab facility, and expected to be there for a couple of months. I will reach out again in February to get patient back on schedule once discharged from [facility].

## 2023-12-18 NOTE — PROGRESS NOTES
"Subjective     Patient ID: Vasquez Gastelum is a 73 y.o. male who presents for Diabetes.    Referring Provider: Tricia Arredondo MD     Diabetes  He presents for his follow-up diabetic visit. He has type 2 diabetes mellitus. His disease course has been stable. There are no hypoglycemic associated symptoms. There are no hypoglycemic complications. He is compliant with treatment all of the time.       Allergies   Allergen Reactions    Tomato Fruit Extract Unknown    Nsaids (Non-Steroidal Anti-Inflammatory Drug) Other and Unknown     ckd    CKD    Other Rash     tomatoes    Regadenoson Rash    Tomato Itching, Other and Rash     \"spaghetti sauce\"   \"spaghetti sauce\"    \"spaghetti sauce\"       Objective     Current DM Pharmacotherapy:   Lantus 8 units daily  Humalog SS  Trulicity 4.5mg weekly    SECONDARY PREVENTION  - Statin? Yes  - ACE-I/ARB? No  - Aspirin? No    Current monitoring regimen:   Patient is using: glucometer    SMBG Readings:   Fastin-140  Before Meals: most meals under 150    Any episodes of hypoglycemia? No  Hypoglycemia awareness? Yes    Pertinent PMH Review:  - PMH of Pancreatitis: No  - PMH/FH of Medullary Thyroid Cancer: No  - PMH of Retinopathy: No  - PMH of Urinary Tract Infections: No    Lab Review  Lab Results   Component Value Date    BILITOT 0.7 2023    CALCIUM 9.2 2023    CO2 23 2023     (H) 2023    CREATININE 2.26 (H) 2023    GLUCOSE 137 (H) 2023    ALKPHOS 128 2023    K 4.5 2023    PROT 7.0 2023     2023    AST 49 (H) 2023    ALT 50 2023    BUN 31 (H) 2023    ANIONGAP 13 2023    ALBUMIN 3.8 2023    GFRMALE 30 (A) 2023     Lab Results   Component Value Date    TRIG 70 2023    CHOL 90 2023    HDL 51.0 2023     Lab Results   Component Value Date    HGBA1C 8.1 (H) 2023     The ASCVD Risk score (Camille DK, et al., 2019) failed to calculate for the following " reasons:    The valid total cholesterol range is 130 to 320 mg/dL      Assessment/Plan     Problem List Items Addressed This Visit       Type 2 diabetes mellitus with hyperglycemia, with long-term current use of insulin (CMS/[facility])     Patient reports he is currently in a [facility] rehab facility, and expected to be there for a couple of months. I will reach out again in February to get patient back on schedule once discharged from [facility].            Type 2 diabetes mellitus, is not at goal <7%. Recent A1C of 8.1% on 11/25    Jorge Vieira PharmD  PGY-1 Resident    Continue all meds under the continuation of care with the referring provider and clinical pharmacy team

## 2024-01-03 ENCOUNTER — TELEPHONE (OUTPATIENT)
Dept: PRIMARY CARE | Facility: CLINIC | Age: 74
End: 2024-01-03
Payer: MEDICARE

## 2024-01-03 NOTE — TELEPHONE ENCOUNTER
Message retrieved from [employer] mail. 1/2/24. 12:32p.  Patient canceled his January 9th appointment with you. His left leg was amputated.

## 2024-01-06 PROCEDURE — RXMED WILLOW AMBULATORY MEDICATION CHARGE

## 2024-01-08 ENCOUNTER — PHARMACY VISIT (OUTPATIENT)
Dept: PHARMACY | Facility: CLINIC | Age: 74
End: 2024-01-08
Payer: MEDICARE

## 2024-01-09 ENCOUNTER — APPOINTMENT (OUTPATIENT)
Dept: PRIMARY CARE | Facility: CLINIC | Age: 74
End: 2024-01-09
Payer: MEDICARE

## 2024-01-15 ENCOUNTER — TELEPHONE (OUTPATIENT)
Dept: PRIMARY CARE | Facility: CLINIC | Age: 74
End: 2024-01-15

## 2024-01-15 NOTE — TELEPHONE ENCOUNTER
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

## 2024-01-23 NOTE — TELEPHONE ENCOUNTER
Patient called in and informed that our provider needs to call the Facility and explained he is to take his dulaglutide (Trulicity) 4.5 mg/0.5 mL pen injector once a week at the facility the nurse needs to have  authorization from the provider or assistant in order to give him his medication. the  at the facility is [facility] nurse practitioner [phone]

## 2024-02-26 ENCOUNTER — TELEPHONE (OUTPATIENT)
Dept: PRIMARY CARE | Facility: CLINIC | Age: 74
End: 2024-02-26
Payer: MEDICARE

## 2024-02-26 NOTE — TELEPHONE ENCOUNTER
Patient was discharged from [facility]  Rehab on 2/24/24. Verbal order is needed for home care, PT and OT.

## 2024-03-04 ENCOUNTER — TELEMEDICINE (OUTPATIENT)
Dept: PRIMARY CARE | Facility: CLINIC | Age: 74
End: 2024-03-04
Payer: MEDICARE

## 2024-03-04 DIAGNOSIS — H57.89 EYE DISCHARGE: ICD-10-CM

## 2024-03-04 DIAGNOSIS — R19.7 DIARRHEA, UNSPECIFIED TYPE: Primary | ICD-10-CM

## 2024-03-04 PROCEDURE — 99442 PR PHYS/QHP TELEPHONE EVALUATION 11-20 MIN: CPT | Performed by: STUDENT IN AN ORGANIZED HEALTH CARE EDUCATION/TRAINING PROGRAM

## 2024-03-04 PROCEDURE — 1159F MED LIST DOCD IN RCRD: CPT | Performed by: STUDENT IN AN ORGANIZED HEALTH CARE EDUCATION/TRAINING PROGRAM

## 2024-03-04 RX ORDER — LOPERAMIDE HCL 2 MG
2 TABLET ORAL 4 TIMES DAILY PRN
Qty: 30 TABLET | Refills: 1 | Status: SHIPPED | OUTPATIENT
Start: 2024-03-04 | End: 2024-03-14

## 2024-03-04 RX ORDER — MOXIFLOXACIN 5 MG/ML
1 SOLUTION/ DROPS OPHTHALMIC 3 TIMES DAILY
Qty: 3 ML | Refills: 0 | Status: SHIPPED | OUTPATIENT
Start: 2024-03-04 | End: 2024-03-11

## 2024-03-04 ASSESSMENT — ENCOUNTER SYMPTOMS
DIARRHEA: 1
ABDOMINAL PAIN: 0
BLOATING: 1
VOMITING: 0
CHILLS: 0
FEVER: 0
FLATUS: 1

## 2024-03-04 NOTE — PROGRESS NOTES
An interactive audio telecommunications system which permits real-time communications between the patient (at the originating site) and provider (at the distant site) was utilized to provide this telehealth service.  Verbal consent was requested and obtained from the pt for a telehealth visit.    Subjective   Patient ID: Vasquez Gastelum is a 74 y.o. male who presents for Diarrhea.  Diarrhea   This is a new problem. The current episode started in the past 7 days. The problem occurs 2 to 4 times per day. The problem has been unchanged. Diarrhea characteristics: loose stool. Associated symptoms include bloating and increased flatus. Pertinent negatives include no abdominal pain, chills, fever or vomiting. Nothing aggravates the symptoms. He has tried bismuth subsalicylate for the symptoms. The treatment provided no relief.     He also reports that he has some crusting, drainage and itching around the right eye and concerned about a possible infection in the eye.  Fortunately due to his transportation and bilateral knee amputation he is unable to come to the office today.  We discussed about treating for possible conjunctivitis empirically however if the symptoms do not improve he needs to be evaluated which she is agreeable with.    Review of Systems   Constitutional:  Negative for chills and fever.   Gastrointestinal:  Positive for bloating, diarrhea and flatus. Negative for abdominal pain and vomiting.       There were no vitals taken for this visit.       Assessment/Plan   Problem List Items Addressed This Visit    None  Visit Diagnoses       Diarrhea, unspecified type    -  Primary    Relevant Medications    loperamide (Imodium A-D) 2 mg tablet    Other Relevant Orders    Calprotectin, Fecal    Lactoferrin, Fecal, Quantitative    Comprehensive Metabolic Panel    Stool Pathogen Panel, PCR    Lipase    Eye discharge        Relevant Medications    moxifloxacin (Vigamox) 0.5 % ophthalmic solution

## 2024-03-06 NOTE — TELEPHONE ENCOUNTER
Juliana, PT assistant, requests hospital bed with motorized components on the controller and a transfer board. Please, fax a script for each item and chart notes to [facility] [place]. [phone].  Next appointment is 3/7/24.

## 2024-03-07 ENCOUNTER — TELEMEDICINE (OUTPATIENT)
Dept: PRIMARY CARE | Facility: CLINIC | Age: 74
End: 2024-03-07
Payer: MEDICARE

## 2024-03-07 VITALS — SYSTOLIC BLOOD PRESSURE: 122 MMHG | DIASTOLIC BLOOD PRESSURE: 70 MMHG

## 2024-03-07 DIAGNOSIS — Z89.432 HISTORY OF AMPUTATION OF LEFT FOOT (MULTI): ICD-10-CM

## 2024-03-07 DIAGNOSIS — Z89.511 HISTORY OF BELOW-KNEE AMPUTATION OF RIGHT LOWER EXTREMITY (MULTI): Primary | ICD-10-CM

## 2024-03-07 DIAGNOSIS — S88.111A: ICD-10-CM

## 2024-03-07 PROBLEM — G40.909: Status: RESOLVED | Noted: 2023-01-27 | Resolved: 2024-03-07

## 2024-03-07 PROBLEM — M86.9: Status: RESOLVED | Noted: 2020-02-19 | Resolved: 2024-03-07

## 2024-03-07 PROCEDURE — 3074F SYST BP LT 130 MM HG: CPT | Performed by: STUDENT IN AN ORGANIZED HEALTH CARE EDUCATION/TRAINING PROGRAM

## 2024-03-07 PROCEDURE — 99442 PR PHYS/QHP TELEPHONE EVALUATION 11-20 MIN: CPT | Performed by: STUDENT IN AN ORGANIZED HEALTH CARE EDUCATION/TRAINING PROGRAM

## 2024-03-07 PROCEDURE — 1159F MED LIST DOCD IN RCRD: CPT | Performed by: STUDENT IN AN ORGANIZED HEALTH CARE EDUCATION/TRAINING PROGRAM

## 2024-03-07 PROCEDURE — 3078F DIAST BP <80 MM HG: CPT | Performed by: STUDENT IN AN ORGANIZED HEALTH CARE EDUCATION/TRAINING PROGRAM

## 2024-03-07 NOTE — PROGRESS NOTES
An interactive audio telecommunications system which permits real-time communications between the patient (at the originating site) and provider (at the distant site) was utilized to provide this telehealth service.  Verbal consent was requested and obtained from the pt for a telehealth visit.    Subjective   Patient ID: Vasquez Gastelum is a 74 y.o. male PMHx of HFrEF, CABG x4, amputation-adjusted morbid obesity  T2DM with neuropathy with LE disease s/p R BKA and L Transmetatarsal Amputation, and also had L calcaneal disease that was treated with partial calcanectomy 6/21/22 and 6 week duration of tigecycline and ceftriaxone, Left BKA (2023) GERD, and CKD stage 3 who presents for Diarrhea and leg amputation  (Hospital follow up).  HPI  Patient has joined a virtual visit to follow-up on his hospital discharge after he was admitted for worsening osteomyelitis of calcaneus which required left BKA on 12/5/2023 by orthopedics at [facility].  After hospital discharge patient was sent to a nursing facility for PT/OT.  He has been discharged to home since the end of January.  He is working with PT and Occupational Therapy at home.  Due to his mobility issues unfortunately he was unable to come to the office for face-to-face evaluation.  He follows up with orthopedics at the [facility].  Given his significant mobility limitation and his other chronic medical conditions including his neuropathy due to type 2 diabetes physical therapy has recommended hospital bed and a transfer board to be sent to [facility].  He is currently awaiting his left BKA prosthesis.    Review of Systems   All other systems reviewed and are negative.      Visit Vitals  /70            Assessment/Plan   Problem List Items Addressed This Visit       History of below-knee amputation of right lower extremity (CMS/HCC) - Primary    Relevant Medications    miscellaneous medical supply misc    miscellaneous medical supply misc     Other Relevant Orders    Disability Placard    History of amputation of foot (CMS/HCC)    Relevant Medications    miscellaneous medical supply misc    miscellaneous medical supply misc    Other Relevant Orders    Disability Placard    Amputation of right lower extremity below knee (CMS/HCC)    Relevant Medications    miscellaneous medical supply misc    miscellaneous medical supply misc    Other Relevant Orders    Disability Placard

## 2024-03-07 NOTE — TELEPHONE ENCOUNTER
[facility] states the Occupational Therapy evaluation has been completed and they will continue to see the patient 1x per week for 5 weeks.

## 2024-03-19 ENCOUNTER — TELEPHONE (OUTPATIENT)
Dept: PRIMARY CARE | Facility: CLINIC | Age: 74
End: 2024-03-19

## 2024-03-19 NOTE — TELEPHONE ENCOUNTER
[facility] needs a new DME request for the hospital bed refaxed. The bottom of the CMN was cut off and wasn't fully faxed over. [facility] also needs an addendum office note because the notes were not thorough enough for the hospital bed.

## 2024-03-22 PROCEDURE — RXMED WILLOW AMBULATORY MEDICATION CHARGE

## 2024-03-22 NOTE — TELEPHONE ENCOUNTER
Marissa, Occupational Therapist with [facility], stated patient canceled today's visit. He was not feeling well. Diarrhea. He was instructed to stay hydrated.

## 2024-03-26 ENCOUNTER — PHARMACY VISIT (OUTPATIENT)
Dept: PHARMACY | Facility: CLINIC | Age: 74
End: 2024-03-26
Payer: MEDICARE

## 2024-03-29 NOTE — TELEPHONE ENCOUNTER
Message retrieved from voice mail. PUJA Ann, OT assistant with [facility], stated patient missed OT for this week. Her schedule is full for Friday. She won't be able to visit him and patient is unable to reach.

## 2024-04-08 ENCOUNTER — TELEPHONE (OUTPATIENT)
Dept: PRIMARY CARE | Facility: CLINIC | Age: 74
End: 2024-04-08
Payer: MEDICARE

## 2024-04-08 NOTE — TELEPHONE ENCOUNTER
Pt calling in wants medication for leg to prevent blood clots, pt is bed bound due to leg amputation.

## 2024-04-16 PROCEDURE — RXMED WILLOW AMBULATORY MEDICATION CHARGE

## 2024-04-17 DIAGNOSIS — M1A.0720 CHRONIC GOUT OF LEFT FOOT, UNSPECIFIED CAUSE: ICD-10-CM

## 2024-04-19 ENCOUNTER — PHARMACY VISIT (OUTPATIENT)
Dept: PHARMACY | Facility: CLINIC | Age: 74
End: 2024-04-19
Payer: MEDICARE

## 2024-04-19 NOTE — TELEPHONE ENCOUNTER
Patient is requesting a referral to return to physical therapy. He states he got is prothesis yesterday. He states he would like to go to [facility]

## 2024-04-22 DIAGNOSIS — Z89.512 HX OF BKA, LEFT (MULTI): ICD-10-CM

## 2024-04-22 DIAGNOSIS — I73.9 PAD (PERIPHERAL ARTERY DISEASE) (CMS-HCC): ICD-10-CM

## 2024-04-22 DIAGNOSIS — Z89.511 HISTORY OF BELOW-KNEE AMPUTATION OF RIGHT LOWER EXTREMITY (MULTI): Primary | ICD-10-CM

## 2024-04-22 RX ORDER — ALLOPURINOL 100 MG/1
50 TABLET ORAL DAILY
Qty: 45 TABLET | Refills: 1 | Status: SHIPPED | OUTPATIENT
Start: 2024-04-22

## 2024-04-22 NOTE — PROGRESS NOTES
Referral for physical therapy per patient request.  He has not received his prosthesis.  He is requesting physical therapy for [facility].

## 2024-04-22 NOTE — TELEPHONE ENCOUNTER
Patient called a second time regarding the following message.     Patient is requesting a referral to return to physical therapy. He states he got is prothesis yesterday. He states he would like to go to [facility]

## 2024-05-14 DIAGNOSIS — Z79.4 TYPE 2 DIABETES MELLITUS WITH HYPERGLYCEMIA, WITH LONG-TERM CURRENT USE OF INSULIN (MULTI): ICD-10-CM

## 2024-05-14 DIAGNOSIS — E11.65 TYPE 2 DIABETES MELLITUS WITH HYPERGLYCEMIA, WITH LONG-TERM CURRENT USE OF INSULIN (MULTI): ICD-10-CM

## 2024-05-14 PROCEDURE — RXMED WILLOW AMBULATORY MEDICATION CHARGE

## 2024-05-14 RX ORDER — DULAGLUTIDE 4.5 MG/.5ML
INJECTION, SOLUTION SUBCUTANEOUS
Qty: 2 ML | Refills: 3 | Status: SHIPPED | OUTPATIENT
Start: 2024-05-14 | End: 2025-05-14

## 2024-05-15 ENCOUNTER — PHARMACY VISIT (OUTPATIENT)
Dept: PHARMACY | Facility: CLINIC | Age: 74
End: 2024-05-15
Payer: MEDICARE

## 2024-05-29 DIAGNOSIS — I50.9 CHRONIC CONGESTIVE HEART FAILURE, UNSPECIFIED HEART FAILURE TYPE (MULTI): Primary | ICD-10-CM

## 2024-05-29 RX ORDER — METOPROLOL SUCCINATE 50 MG/1
50 TABLET, EXTENDED RELEASE ORAL DAILY
Qty: 90 TABLET | Refills: 1 | Status: SHIPPED | OUTPATIENT
Start: 2024-05-29

## 2024-06-11 PROCEDURE — RXMED WILLOW AMBULATORY MEDICATION CHARGE

## 2024-06-13 ENCOUNTER — PHARMACY VISIT (OUTPATIENT)
Dept: PHARMACY | Facility: CLINIC | Age: 74
End: 2024-06-13
Payer: MEDICARE

## 2024-07-05 PROCEDURE — RXMED WILLOW AMBULATORY MEDICATION CHARGE

## 2024-07-09 ENCOUNTER — PHARMACY VISIT (OUTPATIENT)
Dept: PHARMACY | Facility: CLINIC | Age: 74
End: 2024-07-09
Payer: MEDICARE

## 2024-08-01 PROCEDURE — RXMED WILLOW AMBULATORY MEDICATION CHARGE

## 2024-08-05 ENCOUNTER — PHARMACY VISIT (OUTPATIENT)
Dept: PHARMACY | Facility: CLINIC | Age: 74
End: 2024-08-05
Payer: MEDICARE

## 2024-08-29 ENCOUNTER — TELEPHONE (OUTPATIENT)
Dept: PRIMARY CARE | Facility: CLINIC | Age: 74
End: 2024-08-29
Payer: MEDICARE

## 2024-08-29 PROCEDURE — RXMED WILLOW AMBULATORY MEDICATION CHARGE

## 2024-08-30 ENCOUNTER — PHARMACY VISIT (OUTPATIENT)
Dept: PHARMACY | Facility: CLINIC | Age: 74
End: 2024-08-30
Payer: MEDICARE

## 2024-11-19 DIAGNOSIS — Z79.4 TYPE 2 DIABETES MELLITUS WITH HYPERGLYCEMIA, WITH LONG-TERM CURRENT USE OF INSULIN: ICD-10-CM

## 2024-11-19 DIAGNOSIS — E11.65 TYPE 2 DIABETES MELLITUS WITH HYPERGLYCEMIA, WITH LONG-TERM CURRENT USE OF INSULIN: ICD-10-CM

## 2024-11-19 RX ORDER — DULAGLUTIDE 4.5 MG/.5ML
INJECTION, SOLUTION SUBCUTANEOUS
Qty: 6 ML | Refills: 0 | OUTPATIENT
Start: 2024-11-19 | End: 2025-11-19